# Patient Record
Sex: FEMALE | Race: WHITE | HISPANIC OR LATINO | Employment: UNEMPLOYED | URBAN - METROPOLITAN AREA
[De-identification: names, ages, dates, MRNs, and addresses within clinical notes are randomized per-mention and may not be internally consistent; named-entity substitution may affect disease eponyms.]

---

## 2017-05-16 ENCOUNTER — ALLSCRIPTS OFFICE VISIT (OUTPATIENT)
Dept: OTHER | Facility: OTHER | Age: 42
End: 2017-05-16

## 2017-11-08 ENCOUNTER — GENERIC CONVERSION - ENCOUNTER (OUTPATIENT)
Dept: OTHER | Facility: OTHER | Age: 42
End: 2017-11-08

## 2018-01-09 NOTE — RESULT NOTES
Verified Results  STRESS TEST ONLY, EXERCISE [id number] 12:21PM Osiel De Jesus Order Number: [id number]    - Patient Instructions: To schedule this appointment, please contact Central Scheduling at [phone]   Order Number: [id number]    - Patient Instructions: To schedule this appointment, please contact Central Scheduling at [phone]  Test Name Result Flag Reference   STRESS TEST ONLY, EXERCISE (Report)     [facility]  [place], [address]   [phone]     Stress Electrocardiography during Exercise     Name: Denis Villa   MR #: [id number]   Account #: [de-identified]   Study date: 2016   : 1975   Age: 46 years   Gender: Female   Height: 61 in   Weight: 149 lb   BSA: 1 67 m squared     Allergies: NO KNOWN ALLERGIES     Diagnosis: R07 9 - Chest pain, unspecified     Primary Physician: Mark Eckert MD   Referring Physician: Shilpa Leach PA-C   RN: Svitlana Rodriguez RN BSN   Technician: Catrachito Landers   GROUP: [facility]   Interpreting Physician: Irena Sanchez DO   Report Prepared By[de-identified] Svitlana Rodriguez RN BSN     CLINICAL QUESTION: Detection of coronary artery disease  HISTORY: The patient is a 46year old  female  Chest pain status: chest   pain  Coronary artery disease risk factors: hypertension  Cardiovascular   history: none significant  Medications: none  PHYSICAL EXAM: Baseline physical exam screening: normal lung exam      REST ECG: Normal sinus rhythm  Normal baseline ECG  PROCEDURE: Treadmill exercise testing was performed, using the Heriberto protocol  Stress electrocardiographic evaluation was performed       HERIBERTO PROTOCOL:   HR bpm SBP mmHg DBP mmHg Symptoms   Baseline 81 103 87 none   Stage 1 130 135 95 none   Stage 2 171 178 104 moderate dyspnea, fatigue   Immediate 173 155 80 same as above   Recovery 1 98 138 75 subsiding   Recovery 2 88 112 80 none   No medications or fluids given  STRESS SUMMARY: 02 sat at rest 98% and at peak stress 97%  Duration of exercise   was 6 min  The patient exercised to protocol stage 2  Maximal work rate was 7   METs  Maximal heart rate during stress was 173 bpm ( 96 % of maximal predicted   heart rate)  The heart rate response to stress was normal  There was normal   resting blood pressure with an appropriate response to stress  The   rate-pressure product for the peak heart rate and blood pressure was 91361  There was no chest pain during stress  The stress test was terminated due to   achievement of maximal (symptom limited) exercise and achievement of target   heart rate  The stress ECG was negative for ischemia  There were no stress   arrhythmias or conduction abnormalities  SUMMARY:   - Stress results: Duration of exercise was 6 min  Target heart rate was   achieved  There was no chest pain during stress  - ECG conclusions: The stress ECG was negative for ischemia  IMPRESSIONS: Normal study after maximal exercise without reproduction of   symptoms       Prepared and signed by     Ileana Velasco DO   Signed 08/23/2016 18:11:59

## 2018-01-12 VITALS
WEIGHT: 155.19 LBS | TEMPERATURE: 98.1 F | RESPIRATION RATE: 18 BRPM | OXYGEN SATURATION: 96 % | HEIGHT: 61 IN | BODY MASS INDEX: 29.3 KG/M2 | DIASTOLIC BLOOD PRESSURE: 80 MMHG | HEART RATE: 104 BPM | SYSTOLIC BLOOD PRESSURE: 120 MMHG

## 2018-01-14 NOTE — PROGRESS NOTES
Assessment    1  Overweight (278 02) (E66 3)   2  Migraine headache (346 90) (G43 909)   3  Encounter for preventive health examination (V70 0) (Z00 00)    Plan  Cervical cancer screening    · *1 - [facility] Physician Referral  Consult  Status: Voided  Care Summary provided  : Yes  Overweight    · (1) COMPREHENSIVE METABOLIC PANEL; Status:Active; Requested XFE:[id number];    · (1) LIPID PANEL, FASTING; Status:Active; Requested ZTI:[id number];   Screening for depression    · *VB-Depression Screening; Status:Complete;   Done: 73LCH2778 02:52PM  Vaginal discharge    · (1) CHLAMYDIA/GC AMPLIFIED DNA, PCR; Source:Vaginal; Status:Active; Requested  TGO:[id number];    · (1) GENITAL COMPREHENSIVE CULTURE; Source:Vaginal; Status:Active; Requested  VKM:[id number];     Discussion/Summary  health maintenance visit Currently, she eats a healthy diet  cervical cancer screening is current Testing was done today for chlamydia and and vaginal culture screen  Breast cancer screening: breast cancer screening is not indicated  Colorectal cancer screening: colorectal cancer screening is not indicated  Screening lab work includes thyroid function testing  The immunizations are up to date  Advice and education were given regarding nutrition and aerobic exercise  She will start exercising next month when cleared by vascular doctors  She is going to try to continue to eat a healthy diet  Continue on fioricet PRN  Possible side effects of new medications were reviewed with the patient/guardian today  The patient was counseled regarding instructions for management, impressions  Chief Complaint  Pt is here for a follow up to migraines  She states that she is feeling better with the medicine  History of Present Illness  , Adult Female: The patient is being seen for a health maintenance evaluation  The last health maintenance visit was 1 year(s) ago  General Health:  The patient's health since the last visit is described as good  She has regular dental visits  She denies vision problems  She denies hearing loss  Lifestyle:  She consumes a diverse and healthy diet  She has weight concerns  She does not exercise regularly  (cannot exercise for next 1 month due to spider vein treatment)   She does not use tobacco  She denies alcohol use  She denies drug use  Reproductive health:  she reports normal menses  Screening:       HPI: 47 y/o F here for PE  She has been doing better with migraines and fioricet does help  she rarely gets them  She has begun to have non-itchy clear/white vaginal discharge  SHe is sexually active with 1 partner  No UTI symptoms and no pelvic pain      Review of Systems    Constitutional: No fever, no chills, feels well, no tiredness, no recent weight gain or weight loss  Eyes: No complaints of eye pain, no red eyes, no eyesight problems, no discharge, no dry eyes, no itching of eyes  ENT: no complaints of earache, no loss of hearing, no nose bleeds, no nasal discharge, no sore throat, no hoarseness  Cardiovascular: No complaints of slow heart rate, no fast heart rate, no chest pain, no palpitations, no leg claudication, no lower extremity edema  Respiratory: No complaints of shortness of breath, no wheezing, no cough, no SOB on exertion, no orthopnea, no PND  Gastrointestinal: No complaints of abdominal pain, no constipation, no nausea or vomiting, no diarrhea, no bloody stools  Genitourinary: No complaints of dysuria, no incontinence, no pelvic pain, no dysmenorrhea, no vaginal discharge or bleeding  Musculoskeletal: No complaints of arthralgias, no myalgias, no joint swelling or stiffness, no limb pain or swelling  Integumentary: No complaints of skin rash or lesions, no itching, no skin wounds, no breast pain or lump     Neurological: No complaints of headache, no confusion, no convulsions, no numbness, no dizziness or fainting, no tingling, no limb weakness, no difficulty walking  Psychiatric: Not suicidal, no sleep disturbance, no anxiety or depression, no change in personality, no emotional problems  Endocrine: No complaints of proptosis, no hot flashes, no muscle weakness, no deepening of the voice, no feelings of weakness  Hematologic/Lymphatic: No complaints of swollen glands, no swollen glands in the neck, does not bleed easily, does not bruise easily  Active Problems    1  Acute respiratory disease (465 9) (J06 9)   2  Cervical cancer screening (V76 2) (Z12 4)   3  Dysuria (788 1) (R30 0)   4  Head contusion (920) (S00 93XA)   5  Low back pain (724 2) (M54 5)   6  Migraine headache (346 90) (G43 909)   7  Overweight (278 02) (E66 3)   8  Rosacea (695 3) (L71 9)   9  Sacroiliitis (720 2) (M46 1)   10  Scabies (133 0) (B86)   11  Screening for depression (V79 0) (Z13 89)   12  Sore throat (462) (J02 9)   13  Thoracic back pain (724 1) (M54 6)   14  Tinea pedis (110 4) (B35 3)   15  Urinary tract infection (599 0) (N39 0)   16  Urticaria (708 9) (L50 9)   17  Vaginal discharge (623 5) (N89 8)   18  Visit for routine gyn exam (V72 31) (Z01 419)    Past Medical History    · History of hypertension (V12 59) (Z86 79)    Surgical History    · History of Tubal Ligation    Family History  Father    · Family history of Type 2 Diabetes Mellitus  Family History    · Family history of Breast Cancer (V16 3)   · Family history of Cervical Cancer   · Family history of Gastric Cancer (V16 0)   · Family history of Hyperlipidemia   · Family history of Type 2 Diabetes Mellitus    Social History    · Denied: History of Alcohol Use (History)   · Denied: History of Drug Use   · Never A Smoker   · Denied: History of Smoking Cigarettes    Current Meds   1  Fioricet -40 MG Oral Capsule; Take 1 tablet every 6-8 hours as needed for   headache; Therapy: 47Ayk0122 to (Celeste Puls)  Requested for: 13QTT4096; Last   Rx:[id number] Ordered    Allergies    1   No Known Drug Allergies    Vitals   Recorded: 35JHV6635 02:42PM   Temperature 98 3 F, Tympanic   Heart Rate 91   Respiration 20   Systolic 970   Diastolic 80   Height 5 ft 1 in   Weight 148 lb 9 oz   BMI Calculated 28 07   BSA Calculated 1 66   O2 Saturation 99     Physical Exam    Constitutional   General appearance: No acute distress, well appearing and well nourished  Head and Face   Head and face: Normal     Palpation of the face and sinuses: No sinus tenderness  Eyes   Conjunctiva and lids: No swelling, erythema or discharge  Ears, Nose, Mouth, and Throat   External inspection of ears and nose: Normal     Otoscopic examination: Tympanic membranes translucent with normal light reflex  Canals patent without erythema  Oropharynx: Normal with no erythema, edema, exudate or lesions  Neck   Neck: Supple, symmetric, trachea midline, no masses  Thyroid: Normal, no thyromegaly  Pulmonary   Respiratory effort: No increased work of breathing or signs of respiratory distress  Auscultation of lungs: Clear to auscultation  Cardiovascular   Palpation of heart: Normal PMI, no thrills  Auscultation of heart: Normal rate and rhythm, normal S1 and S2, no murmurs  Abdomen   Abdomen: Non-tender, no masses  Liver and spleen: No hepatomegaly or splenomegaly  Genitourinary   External genitalia and vagina: Normal, no lesions appreciated  Lymphatic   Palpation of lymph nodes in neck: No lymphadenopathy  Musculoskeletal   Gait and station: Normal     Muscle strength/tone: Normal     Skin   Skin and subcutaneous tissue: Normal without rashes or lesions  Palpation of skin and subcutaneous tissue: Normal turgor      Psychiatric   Judgment and insight: Normal     Mood and affect: Normal        Results/Data  *VB-Depression Screening [id number] 02:52PM Terence Harris     Test Name Result Flag Reference   Depression Scale Result      Depression Screen - Negative For Symptoms     PHQ-2 Adult Depression Screening [id number] 02:50PM User, [facility]     Test Name Result Flag Reference   PHQ-2 Adult Depression Score 0     Over the last two weeks, how often have you been bothered by any of the following problems?   Little interest or pleasure in doing things: Not at all - 0  Feeling down, depressed, or hopeless: Not at all - 0   PHQ-2 Adult Depression Screening Negative         Signatures   Electronically signed by : UDAY Sanchez; Jun 17 2016 10:40AM EST                       (Author)    Electronically signed by : CHRISTI Barillas ; Jun 20 2016  3:09PM EST

## 2018-01-22 VITALS
TEMPERATURE: 98 F | OXYGEN SATURATION: 100 % | HEART RATE: 87 BPM | SYSTOLIC BLOOD PRESSURE: 122 MMHG | WEIGHT: 162 LBS | RESPIRATION RATE: 18 BRPM | BODY MASS INDEX: 30.58 KG/M2 | DIASTOLIC BLOOD PRESSURE: 84 MMHG | HEIGHT: 61 IN

## 2019-09-09 ENCOUNTER — OFFICE VISIT (OUTPATIENT)
Dept: FAMILY MEDICINE CLINIC | Facility: CLINIC | Age: 44
End: 2019-09-09

## 2019-09-09 VITALS
HEART RATE: 90 BPM | DIASTOLIC BLOOD PRESSURE: 80 MMHG | SYSTOLIC BLOOD PRESSURE: 126 MMHG | HEIGHT: 62 IN | RESPIRATION RATE: 18 BRPM | BODY MASS INDEX: 29.63 KG/M2 | WEIGHT: 161 LBS | TEMPERATURE: 97 F | OXYGEN SATURATION: 98 %

## 2019-09-09 DIAGNOSIS — Z12.39 BREAST CANCER SCREENING: ICD-10-CM

## 2019-09-09 DIAGNOSIS — J30.9 ALLERGIC RHINITIS, UNSPECIFIED SEASONALITY, UNSPECIFIED TRIGGER: ICD-10-CM

## 2019-09-09 DIAGNOSIS — M25.542 JOINT PAIN IN FINGERS OF BOTH HANDS: Primary | ICD-10-CM

## 2019-09-09 DIAGNOSIS — G43.709 CHRONIC MIGRAINE WITHOUT AURA WITHOUT STATUS MIGRAINOSUS, NOT INTRACTABLE: ICD-10-CM

## 2019-09-09 DIAGNOSIS — R60.1 GENERALIZED EDEMA: ICD-10-CM

## 2019-09-09 DIAGNOSIS — E66.3 OVERWEIGHT: ICD-10-CM

## 2019-09-09 DIAGNOSIS — Z80.3 FAMILY HISTORY OF BREAST CANCER: ICD-10-CM

## 2019-09-09 DIAGNOSIS — M25.541 JOINT PAIN IN FINGERS OF BOTH HANDS: Primary | ICD-10-CM

## 2019-09-09 PROBLEM — H69.83 DYSFUNCTION OF BOTH EUSTACHIAN TUBES: Status: ACTIVE | Noted: 2017-05-16

## 2019-09-09 PROBLEM — R42 VERTIGO: Status: ACTIVE | Noted: 2017-05-16

## 2019-09-09 PROCEDURE — 99214 OFFICE O/P EST MOD 30 MIN: CPT | Performed by: PHYSICIAN ASSISTANT

## 2019-09-09 RX ORDER — BUTALBITAL, ACETAMINOPHEN AND CAFFEINE 300; 40; 50 MG/1; MG/1; MG/1
CAPSULE ORAL
COMMUNITY
Start: 2014-04-14 | End: 2019-09-09 | Stop reason: SDUPTHER

## 2019-09-09 RX ORDER — FLUTICASONE PROPIONATE 50 MCG
2 SPRAY, SUSPENSION (ML) NASAL DAILY
Qty: 3 BOTTLE | Refills: 1 | Status: SHIPPED | OUTPATIENT
Start: 2019-09-09 | End: 2020-05-28 | Stop reason: SDUPTHER

## 2019-09-09 RX ORDER — FLUTICASONE PROPIONATE 50 MCG
2 SPRAY, SUSPENSION (ML) NASAL DAILY
COMMUNITY
Start: 2017-05-16 | End: 2019-09-09 | Stop reason: SDUPTHER

## 2019-09-09 RX ORDER — BUTALBITAL, ACETAMINOPHEN AND CAFFEINE 300; 40; 50 MG/1; MG/1; MG/1
1 CAPSULE ORAL EVERY 4 HOURS PRN
Qty: 30 CAPSULE | Refills: 2 | Status: SHIPPED | OUTPATIENT
Start: 2019-09-09 | End: 2019-10-23

## 2019-09-09 RX ORDER — MELOXICAM 15 MG/1
15 TABLET ORAL DAILY
Qty: 30 TABLET | Refills: 1 | Status: SHIPPED | OUTPATIENT
Start: 2019-09-09 | End: 2020-07-07 | Stop reason: SDUPTHER

## 2019-09-09 NOTE — PROGRESS NOTES
Assessment/Plan:    Allergic rhinitis  Continue flonase      Migraine headache  Continue PRN fioricet      Joint pain in fingers of both hands  Labs to be done-CBC, CMP, CRP, lyme, RF, CCP  Take meloxicam for pain      Overweight  BMI Counseling: Body mass index is 29 45 kg/m²  Discussed the patient's BMI with her  The BMI is above average  BMI counseling and education was provided to the patient  Exercise recommendations include moderate aerobic physical activity for 150 minutes/week  Problem List Items Addressed This Visit        Respiratory    Allergic rhinitis     Continue flonase           Relevant Medications    Butalbital-APAP-Caffeine -40 MG CAPS       Cardiovascular and Mediastinum    Migraine headache     Continue PRN fioricet           Relevant Medications    fluticasone (FLONASE) 50 mcg/act nasal spray    Butalbital-APAP-Caffeine -40 MG CAPS    meloxicam (MOBIC) 15 mg tablet       Other    Joint pain in fingers of both hands - Primary     Labs to be done-CBC, CMP, CRP, lyme, RF, CCP  Take meloxicam for pain           Relevant Medications    meloxicam (MOBIC) 15 mg tablet    Other Relevant Orders    CBC and differential    Comprehensive metabolic panel    TSH, 3rd generation    Lyme Antibody Profile with reflex to WB    RF Screen w/ Reflex to Titer    Cyclic citrul peptide antibody, IgG    C-reactive protein    Overweight     BMI Counseling: Body mass index is 29 45 kg/m²  Discussed the patient's BMI with her  The BMI is above average  BMI counseling and education was provided to the patient  Exercise recommendations include moderate aerobic physical activity for 150 minutes/week                 Other Visit Diagnoses     Breast cancer screening        Relevant Orders    Mammo screening bilateral w 3d & cad    Generalized edema        Relevant Orders    CBC and differential    Comprehensive metabolic panel    Family history of breast cancer                Subjective:      Patient ID: Valentin Sam is a 45 y o  female  HPI  63-year-old female with history of migraines here for a follow-up of migraines  When she has headaches is typically last 2-3 days  She a has been taking Fioricet in the past and does help  She is gettign sometimes  headaches 3 times a month  Sometimes she does feel a pressure in the back of the head        She is getting pain in her hands, feet, back  She gets no swelling in the joints but sometiems she gets it in the feet and hands diffusely  She gets stiffness in her back in the AM   She did PT in the past in for back pain and it did help  She has been using OTC aleeve to help  Back pain has been going on for years and joint pains has started to get worse in last 2 years  She also history of allergic rhinitis  In the past she was taking Flonase and did help  Symptoms for consisting of nasal congestion rhinorrhea  No ophthalmic symptoms  PHQ-9 Depression Screening    PHQ-9:    Frequency of the following problems over the past two weeks:       Little interest or pleasure in doing things:  0 - not at all  Feeling down, depressed, or hopeless:  0 - not at all  PHQ-2 Score:  0            The following portions of the patient's history were reviewed and updated as appropriate:   She  has a past medical history of Hypertension  She   Patient Active Problem List    Diagnosis Date Noted    Joint pain in fingers of both hands 09/09/2019    Overweight 09/09/2019    Allergic rhinitis 11/08/2017    Dysfunction of both eustachian tubes 05/16/2017    Vertigo 05/16/2017    Abnormal EKG 08/16/2016    Chest pain 08/16/2016    Low back pain 08/01/2014    Rosacea 05/19/2014    Difficult or painful urination 04/14/2014    Thoracic back pain 04/14/2014    Migraine headache 04/14/2014     She  has a past surgical history that includes Tubal ligation    Her family history includes Breast cancer in her family; Cervical cancer in her family; Diabetes type II in her father; Hyperlipidemia in her family; Stomach cancer in her family  She  reports that she has never smoked  She has never used smokeless tobacco  She reports that she does not drink alcohol or use drugs  Current Outpatient Medications   Medication Sig Dispense Refill    Butalbital-APAP-Caffeine -40 MG CAPS Take 1 capsule by mouth every 4 (four) hours as needed (headache) 30 capsule 2    fluticasone (FLONASE) 50 mcg/act nasal spray 2 sprays into each nostril daily 3 Bottle 1    meloxicam (MOBIC) 15 mg tablet Take 1 tablet (15 mg total) by mouth daily 30 tablet 1     No current facility-administered medications for this visit  Current Outpatient Medications on File Prior to Visit   Medication Sig    [DISCONTINUED] Butalbital-APAP-Caffeine -40 MG CAPS Take by mouth    [DISCONTINUED] fluticasone (FLONASE) 50 mcg/act nasal spray 2 sprays into each nostril daily     No current facility-administered medications on file prior to visit  She has No Known Allergies       Review of Systems   Constitutional: Positive for fatigue  Negative for activity change, appetite change, chills and unexpected weight change  HENT: Negative for dental problem, ear pain, hearing loss and sore throat  Eyes: Negative for visual disturbance  Respiratory: Negative for cough and wheezing  Cardiovascular: Negative for chest pain  Gastrointestinal: Negative for abdominal pain, constipation, diarrhea and vomiting  Genitourinary: Negative for difficulty urinating and dysuria  Musculoskeletal: Positive for arthralgias and back pain  Negative for myalgias  Skin: Negative for rash  Neurological: Positive for headaches  Negative for dizziness  Psychiatric/Behavioral: Negative for behavioral problems           Objective:      /80 (BP Location: Left arm, Patient Position: Sitting, Cuff Size: Standard)   Pulse 90   Temp (!) 97 °F (36 1 °C) (Temporal)   Resp 18   Ht 5' 2" (1 575 m)   Wt 73 kg (161 lb) LMP 09/08/2019 (Exact Date)   SpO2 98%   Breastfeeding? No   BMI 29 45 kg/m²          Physical Exam   Constitutional: She is oriented to person, place, and time  She appears well-developed and well-nourished  No distress  HENT:   Head: Normocephalic and atraumatic  Right Ear: External ear normal    Left Ear: External ear normal    Eyes: Conjunctivae are normal    Neck: Normal range of motion  Neck supple  No thyromegaly present  Cardiovascular: Normal rate, regular rhythm and normal heart sounds  No murmur heard  Pulmonary/Chest: Effort normal and breath sounds normal  No respiratory distress  She has no wheezes  Musculoskeletal: She exhibits edema (swelling of hands and feet  Increased at MCP and PIP joint and less at DIP joint) and tenderness (thoracic T6-8 and lumbar L3-5)  Lymphadenopathy:     She has no cervical adenopathy  Neurological: She is alert and oriented to person, place, and time  Psychiatric: She has a normal mood and affect  Her behavior is normal    Nursing note and vitals reviewed

## 2019-09-09 NOTE — ASSESSMENT & PLAN NOTE
BMI Counseling: Body mass index is 29 45 kg/m²  Discussed the patient's BMI with her  The BMI is above average  BMI counseling and education was provided to the patient  Exercise recommendations include moderate aerobic physical activity for 150 minutes/week

## 2019-09-09 NOTE — PROGRESS NOTES
She is gettign sometimes  Headaches 3 migraines  Sometimes she does feel a pressure in the back of the head  Sometimes the fioricet helps and sometimes it doesn't  She does get pain in the head    She is getting pain in her hands, feet, back  She gets no swelling but sometiems she gets it in the fet  She gets stiffness in her back in the AM   She did PT in the past in

## 2019-09-30 ENCOUNTER — LAB (OUTPATIENT)
Dept: LAB | Facility: CLINIC | Age: 44
End: 2019-09-30
Payer: COMMERCIAL

## 2019-09-30 DIAGNOSIS — M25.542 JOINT PAIN IN FINGERS OF BOTH HANDS: ICD-10-CM

## 2019-09-30 DIAGNOSIS — R60.1 GENERALIZED EDEMA: ICD-10-CM

## 2019-09-30 DIAGNOSIS — M25.541 JOINT PAIN IN FINGERS OF BOTH HANDS: ICD-10-CM

## 2019-09-30 LAB
ALBUMIN SERPL BCP-MCNC: 4.1 G/DL (ref 3.5–5)
ALP SERPL-CCNC: 84 U/L (ref 46–116)
ALT SERPL W P-5'-P-CCNC: 18 U/L (ref 12–78)
ANION GAP SERPL CALCULATED.3IONS-SCNC: 6 MMOL/L (ref 4–13)
AST SERPL W P-5'-P-CCNC: 9 U/L (ref 5–45)
BASOPHILS # BLD AUTO: 0.04 THOUSANDS/ΜL (ref 0–0.1)
BASOPHILS NFR BLD AUTO: 1 % (ref 0–1)
BILIRUB SERPL-MCNC: 0.73 MG/DL (ref 0.2–1)
BUN SERPL-MCNC: 13 MG/DL (ref 5–25)
CALCIUM SERPL-MCNC: 9.2 MG/DL (ref 8.3–10.1)
CHLORIDE SERPL-SCNC: 108 MMOL/L (ref 100–108)
CO2 SERPL-SCNC: 24 MMOL/L (ref 21–32)
CREAT SERPL-MCNC: 0.67 MG/DL (ref 0.6–1.3)
CRP SERPL QL: 4.8 MG/L
EOSINOPHIL # BLD AUTO: 0.18 THOUSAND/ΜL (ref 0–0.61)
EOSINOPHIL NFR BLD AUTO: 2 % (ref 0–6)
ERYTHROCYTE [DISTWIDTH] IN BLOOD BY AUTOMATED COUNT: 12.2 % (ref 11.6–15.1)
GFR SERPL CREATININE-BSD FRML MDRD: 109 ML/MIN/1.73SQ M
GLUCOSE P FAST SERPL-MCNC: 85 MG/DL (ref 65–99)
HCT VFR BLD AUTO: 44.6 % (ref 34.8–46.1)
HGB BLD-MCNC: 14.3 G/DL (ref 11.5–15.4)
IMM GRANULOCYTES # BLD AUTO: 0.03 THOUSAND/UL (ref 0–0.2)
IMM GRANULOCYTES NFR BLD AUTO: 0 % (ref 0–2)
LYMPHOCYTES # BLD AUTO: 2.75 THOUSANDS/ΜL (ref 0.6–4.47)
LYMPHOCYTES NFR BLD AUTO: 32 % (ref 14–44)
MCH RBC QN AUTO: 28.7 PG (ref 26.8–34.3)
MCHC RBC AUTO-ENTMCNC: 32.1 G/DL (ref 31.4–37.4)
MCV RBC AUTO: 90 FL (ref 82–98)
MONOCYTES # BLD AUTO: 0.65 THOUSAND/ΜL (ref 0.17–1.22)
MONOCYTES NFR BLD AUTO: 8 % (ref 4–12)
NEUTROPHILS # BLD AUTO: 5.07 THOUSANDS/ΜL (ref 1.85–7.62)
NEUTS SEG NFR BLD AUTO: 57 % (ref 43–75)
NRBC BLD AUTO-RTO: 0 /100 WBCS
PLATELET # BLD AUTO: 300 THOUSANDS/UL (ref 149–390)
PMV BLD AUTO: 10.1 FL (ref 8.9–12.7)
POTASSIUM SERPL-SCNC: 3.9 MMOL/L (ref 3.5–5.3)
PROT SERPL-MCNC: 8.3 G/DL (ref 6.4–8.2)
RBC # BLD AUTO: 4.98 MILLION/UL (ref 3.81–5.12)
SODIUM SERPL-SCNC: 138 MMOL/L (ref 136–145)
TSH SERPL DL<=0.05 MIU/L-ACNC: 1.31 UIU/ML (ref 0.36–3.74)
WBC # BLD AUTO: 8.72 THOUSAND/UL (ref 4.31–10.16)

## 2019-09-30 PROCEDURE — 86140 C-REACTIVE PROTEIN: CPT

## 2019-09-30 PROCEDURE — 36415 COLL VENOUS BLD VENIPUNCTURE: CPT

## 2019-09-30 PROCEDURE — 86200 CCP ANTIBODY: CPT

## 2019-09-30 PROCEDURE — 80053 COMPREHEN METABOLIC PANEL: CPT

## 2019-09-30 PROCEDURE — 86618 LYME DISEASE ANTIBODY: CPT

## 2019-09-30 PROCEDURE — 86430 RHEUMATOID FACTOR TEST QUAL: CPT

## 2019-09-30 PROCEDURE — 85025 COMPLETE CBC W/AUTO DIFF WBC: CPT

## 2019-09-30 PROCEDURE — 84443 ASSAY THYROID STIM HORMONE: CPT

## 2019-10-01 LAB
B BURGDOR IGG+IGM SER-ACNC: <0.91 ISR (ref 0–0.9)
RHEUMATOID FACT SER QL LA: NEGATIVE

## 2019-10-02 ENCOUNTER — TELEPHONE (OUTPATIENT)
Dept: FAMILY MEDICINE CLINIC | Facility: CLINIC | Age: 44
End: 2019-10-02

## 2019-10-02 LAB — CCP IGA+IGG SERPL IA-ACNC: 15 UNITS (ref 0–19)

## 2019-10-04 NOTE — RESULT ENCOUNTER NOTE
Her blood work looked good  She did have a little bit of inflammation however he was not significantly high  The rheumatoid arthritis and Lyme testing negative  It may be possible that she has something such as fibromyalgia which is when the nerves in the body start misfiring and  Causing pain  I recommend she continue with the meloxicam to see if that does help if there is no change we can consider different medication

## 2019-10-08 ENCOUNTER — OFFICE VISIT (OUTPATIENT)
Dept: FAMILY MEDICINE CLINIC | Facility: CLINIC | Age: 44
End: 2019-10-08

## 2019-10-08 VITALS
HEART RATE: 70 BPM | WEIGHT: 164 LBS | OXYGEN SATURATION: 95 % | BODY MASS INDEX: 30.18 KG/M2 | TEMPERATURE: 97.3 F | DIASTOLIC BLOOD PRESSURE: 80 MMHG | HEIGHT: 62 IN | SYSTOLIC BLOOD PRESSURE: 110 MMHG | RESPIRATION RATE: 18 BRPM

## 2019-10-08 DIAGNOSIS — M54.50 CHRONIC BILATERAL LOW BACK PAIN WITHOUT SCIATICA: Primary | ICD-10-CM

## 2019-10-08 DIAGNOSIS — M79.641 RIGHT HAND PAIN: ICD-10-CM

## 2019-10-08 DIAGNOSIS — M25.542 JOINT PAIN IN FINGERS OF BOTH HANDS: ICD-10-CM

## 2019-10-08 DIAGNOSIS — G89.29 CHRONIC BILATERAL LOW BACK PAIN WITHOUT SCIATICA: Primary | ICD-10-CM

## 2019-10-08 DIAGNOSIS — M25.541 JOINT PAIN IN FINGERS OF BOTH HANDS: ICD-10-CM

## 2019-10-08 DIAGNOSIS — E66.3 OVERWEIGHT: ICD-10-CM

## 2019-10-08 DIAGNOSIS — M79.601 RIGHT ARM PAIN: ICD-10-CM

## 2019-10-08 PROCEDURE — 3008F BODY MASS INDEX DOCD: CPT | Performed by: PHYSICIAN ASSISTANT

## 2019-10-08 PROCEDURE — 99214 OFFICE O/P EST MOD 30 MIN: CPT | Performed by: PHYSICIAN ASSISTANT

## 2019-10-08 RX ORDER — CYCLOBENZAPRINE HCL 5 MG
5 TABLET ORAL
Qty: 30 TABLET | Refills: 1 | Status: SHIPPED | OUTPATIENT
Start: 2019-10-08 | End: 2020-07-07

## 2019-10-08 NOTE — PROGRESS NOTES
Assessment/Plan:    Overweight  BMI Counseling: Body mass index is 30 kg/m²  The BMI is above normal  Patient referred to weight management due to patient being obese  She will set up appointment with Dr Dina Dubon      Low back pain  To start PT  Continue  meloxicam and start Flexeril 5 mg night    Joint pain in fingers of both hands   Rheumatologic workup was negative  Recommend she continue taking meloxicam   Will get x-ray of the right hand and wrist since this seems to be the worst of the pain other than  Back  Problem List Items Addressed This Visit        Other    Low back pain - Primary     To start PT  Continue  meloxicam and start Flexeril 5 mg night         Relevant Medications    cyclobenzaprine (FLEXERIL) 5 mg tablet    Other Relevant Orders    Ambulatory referral to Physical Therapy    Joint pain in fingers of both hands      Rheumatologic workup was negative  Recommend she continue taking meloxicam   Will get x-ray of the right hand and wrist since this seems to be the worst of the pain other than  Back  Overweight     BMI Counseling: Body mass index is 30 kg/m²  The BMI is above normal  Patient referred to weight management due to patient being obese  She will set up appointment with Dr Dina Dubon             Other Visit Diagnoses     Right arm pain        Relevant Orders    XR hand 3+ vw right    XR wrist 3+ vw right    Right hand pain        Relevant Orders    XR hand 3+ vw right    XR wrist 3+ vw right            Subjective:      Patient ID: Esther Gallegos is a 45 y o  female  HPI    46-year-old female here for a follow-up for joint pains  She has been getting pain  hands, feet, back  She was not getting swelling in the joints but she does get them in her hands and feet  She does have frequent stiffness the joints which is typically worse in the morning    She notes her lower back is often stiff especially in the morning and she often has problems opening closing her right hand and she gets swelling of the right hand  Sometimes the pain radiates from her right shoulder and to her right arm  She does sometimes get neck pain as well  She was started on meloxicam and it does help some but she is still getting stiffness in the morning  She had wanted to startphysical therapy for her back pain as it has helped before She also notes she has gained weight and she is going to be going to weight loss doctor to try to lose some  She states that she typically is eating small portions and   Not eating a  lot  She has not getting any structured exercise right now  The following portions of the patient's history were reviewed and updated as appropriate:   She  has a past medical history of Hypertension  She   Patient Active Problem List    Diagnosis Date Noted    Joint pain in fingers of both hands 09/09/2019    Overweight 09/09/2019    Allergic rhinitis 11/08/2017    Dysfunction of both eustachian tubes 05/16/2017    Vertigo 05/16/2017    Abnormal EKG 08/16/2016    Chest pain 08/16/2016    Low back pain 08/01/2014    Rosacea 05/19/2014    Difficult or painful urination 04/14/2014    Thoracic back pain 04/14/2014    Migraine headache 04/14/2014     She  has a past surgical history that includes Tubal ligation  Her family history includes Breast cancer in her family; Cervical cancer in her family; Diabetes type II in her father; Hyperlipidemia in her family; Stomach cancer in her family  She  reports that she has never smoked  She has never used smokeless tobacco  She reports that she does not drink alcohol or use drugs    Current Outpatient Medications   Medication Sig Dispense Refill    Butalbital-APAP-Caffeine -40 MG CAPS Take 1 capsule by mouth every 4 (four) hours as needed (headache) 30 capsule 2    fluticasone (FLONASE) 50 mcg/act nasal spray 2 sprays into each nostril daily 3 Bottle 1    meloxicam (MOBIC) 15 mg tablet Take 1 tablet (15 mg total) by mouth daily 30 tablet 1    cyclobenzaprine (FLEXERIL) 5 mg tablet Take 1 tablet (5 mg total) by mouth daily at bedtime 30 tablet 1     No current facility-administered medications for this visit  Current Outpatient Medications on File Prior to Visit   Medication Sig    Butalbital-APAP-Caffeine -40 MG CAPS Take 1 capsule by mouth every 4 (four) hours as needed (headache)    fluticasone (FLONASE) 50 mcg/act nasal spray 2 sprays into each nostril daily    meloxicam (MOBIC) 15 mg tablet Take 1 tablet (15 mg total) by mouth daily     No current facility-administered medications on file prior to visit  She has No Known Allergies       Review of Systems   Constitutional: Positive for fatigue  Negative for activity change, appetite change, chills and unexpected weight change  HENT: Negative for dental problem, ear pain, hearing loss and sore throat  Eyes: Negative for visual disturbance  Respiratory: Negative for cough and wheezing  Cardiovascular: Negative for chest pain and leg swelling  Gastrointestinal: Negative for abdominal pain, constipation, diarrhea and vomiting  Genitourinary: Negative for difficulty urinating and dysuria  Musculoskeletal: Positive for arthralgias and back pain  Negative for myalgias  Skin: Negative for rash  Neurological: Negative for dizziness and headaches  Psychiatric/Behavioral: Negative for behavioral problems  Objective:      /80 (BP Location: Left arm, Patient Position: Sitting, Cuff Size: Standard)   Pulse 70   Temp (!) 97 3 °F (36 3 °C) (Temporal)   Resp 18   Ht 5' 2" (1 575 m)   Wt 74 4 kg (164 lb)   LMP 10/02/2019   SpO2 95%   Breastfeeding? No   BMI 30 00 kg/m²          Physical Exam   Constitutional: She is oriented to person, place, and time  She appears well-developed and well-nourished  No distress  HENT:   Head: Normocephalic and atraumatic     Right Ear: External ear normal    Left Ear: External ear normal    Eyes: Conjunctivae are normal    Neck: Normal range of motion  Neck supple  No thyromegaly present  Cardiovascular: Normal rate, regular rhythm and normal heart sounds  No murmur heard  Pulmonary/Chest: Effort normal and breath sounds normal  No respiratory distress  She has no wheezes  Musculoskeletal: She exhibits tenderness  Lymphadenopathy:     She has no cervical adenopathy  Neurological: She is alert and oriented to person, place, and time  Psychiatric: She has a normal mood and affect  Her behavior is normal    Nursing note and vitals reviewed

## 2019-10-10 NOTE — ASSESSMENT & PLAN NOTE
Rheumatologic workup was negative  Recommend she continue taking meloxicam   Will get x-ray of the right hand and wrist since this seems to be the worst of the pain other than  Back

## 2019-10-10 NOTE — ASSESSMENT & PLAN NOTE
BMI Counseling: Body mass index is 30 kg/m²  The BMI is above normal  Patient referred to weight management due to patient being obese   She will set up appointment with Dr Jean Solo

## 2019-10-21 ENCOUNTER — TELEPHONE (OUTPATIENT)
Dept: FAMILY MEDICINE CLINIC | Facility: CLINIC | Age: 44
End: 2019-10-21

## 2019-10-23 DIAGNOSIS — G43.709 CHRONIC MIGRAINE WITHOUT AURA WITHOUT STATUS MIGRAINOSUS, NOT INTRACTABLE: Primary | ICD-10-CM

## 2019-10-23 RX ORDER — BUTALBITAL, ACETAMINOPHEN AND CAFFEINE 50; 325; 40 MG/1; MG/1; MG/1
1 TABLET ORAL EVERY 6 HOURS PRN
Qty: 30 TABLET | Refills: 1 | Status: SHIPPED | OUTPATIENT
Start: 2019-10-23 | End: 2020-05-28 | Stop reason: SDUPTHER

## 2019-11-04 ENCOUNTER — EVALUATION (OUTPATIENT)
Dept: PHYSICAL THERAPY | Facility: MEDICAL CENTER | Age: 44
End: 2019-11-04
Payer: COMMERCIAL

## 2019-11-04 DIAGNOSIS — M54.50 CHRONIC BILATERAL LOW BACK PAIN WITHOUT SCIATICA: Primary | ICD-10-CM

## 2019-11-04 DIAGNOSIS — G89.29 CHRONIC BILATERAL LOW BACK PAIN WITHOUT SCIATICA: Primary | ICD-10-CM

## 2019-11-04 PROCEDURE — 97112 NEUROMUSCULAR REEDUCATION: CPT | Performed by: PHYSICAL MEDICINE & REHABILITATION

## 2019-11-04 PROCEDURE — 97162 PT EVAL MOD COMPLEX 30 MIN: CPT | Performed by: PHYSICAL MEDICINE & REHABILITATION

## 2019-11-04 NOTE — PROGRESS NOTES
PT Evaluation     Today's date: 2019  Patient name: Abelino Khanna  : 1975  MRN: [id number]  Referring provider: Baldo Sanders PA-C  Dx:   Encounter Diagnosis     ICD-10-CM    1  Chronic bilateral low back pain without sciatica M54 5 Ambulatory referral to Physical Therapy    G89 29                   Assessment  Assessment details: Abelino Khanna is a pleasant 45 y o  female who presents with chronic lower back pain  The patient's greatest concerns are worry over not knowing what's wrong and fear of not being able to keep active  No further referral appears necessary at this time based upon examination results  Primary movement impairment diagnosis of poor lumbopelvic movement coordination resulting in pathoanatomical symptoms of Chronic bilateral low back pain without sciatica  (primary encounter diagnosis) and limiting her ability to carry, exercise or recreation, lift, perform household chores and reach overhead  Impairments include:  1) poor lumbopelvic movement coordination - addressing with neuromotor retraining   2) central sensitization - addressing with extensive counseling regarding pain neuroscience, diagnosis, prognosis, care options, and care planning  3) hip hypomobility - addressing with mobs and mobility exercises   4) LE neural tension - addressing with mobs and mobility exercises   5) poor lifting mechanics - addressing with functional retraining  6) transfer intolerance - addressing with functional retraining     Etiologic factors include fibromyalgia      Impairments: abnormal coordination, abnormal muscle firing, abnormal muscle tone, abnormal or restricted ROM, abnormal movement, activity intolerance, difficulty understanding, impaired physical strength, lacks appropriate home exercise program and pain with function    Symptom irritability: moderateUnderstanding of Dx/Px/POC: fair   Prognosis: good  Prognosis details: Positive prognostic indicators include positive attitude toward recovery  Negative prognostic indicators include chronicity of symptoms, high symptom irritability, fibromyalgia and obesity  Goals  Patient will be independent with home exercise program    Patient will be able to manage symptoms independently  Patient will be able to roll in bed without limitation due to pain  Patient will be able to get in/out of her car without limitation due to pain  Patient will be able to get in/out of bed without limitation due to pain  Patient will be able to squat to  objects from the floor without limitation due to pain  Patient will be able to lift without limitation due to pain  Plan  Plan details: Prognosis above is given PT services 2x/week tapering to 2x/month over the next 3 months and home program adherence  Patient would benefit from: skilled physical therapy  Planned modality interventions: thermotherapy: hydrocollator packs  Planned therapy interventions: abdominal trunk stabilization, activity modification, joint mobilization, manual therapy, motor coordination training, neuromuscular re-education, patient education, self care, therapeutic activities, therapeutic exercise, graded activity, home exercise program and behavior modification  Treatment plan discussed with: patient        Subjective Evaluation    History of Present Illness  Mechanism of injury: Work/School: does not work,   Home Life: getting off of the floor after painting her nails is very hard,   Hobbies/exercise: shopping, carrying the bags is more challenging,   Pain location: patient reports pain is all over,   HPI: Patient reports she felt bone pain for more than six months because of fibromyalgia  Patient reports she has to crawl out of bed due to her back pain and lifting her arms above her hurts     Aggravating factors: patient reports her pain is inconsistent based on how she gets up in the morning, she reports twisting back and forth is painful,   Relieving factors: patient denies that she has found anything that helps her pain  PMH: 4 years ago she had back pain, she had physical therapy at that point  Pain  Current pain ratin  At best pain rating: 3  At worst pain rating: 10    Patient Goals  Patient goal: be able to do her activites, and try to learn some exercises to help with her health        Objective     Static Posture     Comments  Lumbar ROM:  Flexion: 25% limited,  Extension: 50% limited, slight rotation to the right    Repeated motions:  Flexion: increase in pain  Extension: no change  Prone extension: slight centralization  Over pressure was not performed secondary to patients fibromyalgia    Transverse abdominis decreased feedforward noted during positional transfers  Patient reported TTP along all common fibromyalgia trigger points  Patient education was given on self myofascial release techniques  Exercise based HEP was given to strengthen abdominals and decrease fibromyalgia symptoms                Precautions: none      Manual                                                                                   Exercise Diary                                                                                                                                                                                                                                                                     HEP teaching and return demonstration 10"                Modalities

## 2019-11-08 ENCOUNTER — HOSPITAL ENCOUNTER (OUTPATIENT)
Dept: MAMMOGRAPHY | Facility: MEDICAL CENTER | Age: 44
Discharge: HOME | End: 2019-11-08
Payer: COMMERCIAL

## 2019-11-08 VITALS — HEIGHT: 62 IN | BODY MASS INDEX: 29.26 KG/M2 | WEIGHT: 159 LBS

## 2019-11-08 DIAGNOSIS — Z12.39 BREAST CANCER SCREENING: ICD-10-CM

## 2019-11-08 PROCEDURE — 77063 BREAST TOMOSYNTHESIS BI: CPT

## 2019-11-08 PROCEDURE — 77067 SCR MAMMO BI INCL CAD: CPT

## 2019-11-19 ENCOUNTER — OFFICE VISIT (OUTPATIENT)
Dept: PHYSICAL THERAPY | Facility: MEDICAL CENTER | Age: 44
End: 2019-11-19
Payer: COMMERCIAL

## 2019-11-19 DIAGNOSIS — G89.29 CHRONIC BILATERAL LOW BACK PAIN WITHOUT SCIATICA: Primary | ICD-10-CM

## 2019-11-19 DIAGNOSIS — M54.50 CHRONIC BILATERAL LOW BACK PAIN WITHOUT SCIATICA: Primary | ICD-10-CM

## 2019-11-19 PROCEDURE — 97110 THERAPEUTIC EXERCISES: CPT | Performed by: PHYSICAL MEDICINE & REHABILITATION

## 2019-11-19 PROCEDURE — 97112 NEUROMUSCULAR REEDUCATION: CPT | Performed by: PHYSICAL MEDICINE & REHABILITATION

## 2019-11-19 PROCEDURE — 97140 MANUAL THERAPY 1/> REGIONS: CPT | Performed by: PHYSICAL MEDICINE & REHABILITATION

## 2019-11-19 PROCEDURE — 97530 THERAPEUTIC ACTIVITIES: CPT | Performed by: PHYSICAL MEDICINE & REHABILITATION

## 2019-11-19 NOTE — PROGRESS NOTES
Daily Note     Today's date: 2019  Patient name: Alex Parisi  : 1975  MRN: [id number]  Referring provider: Karolyn Prieto PA-C  Dx:   Encounter Diagnosis     ICD-10-CM    1  Chronic bilateral low back pain without sciatica M54 5     G89 29                   Subjective: Ben Fragoso reported "I am having pain today "      Objective: See treatment diary below      Assessment: Tolerated treatment well  Patient would benefit from continued PT  Alex Parisi was able to initiate her therapy program which shows progress towards her goals  Skin integrity was checked pre and post modalities with no abnormal findings  She reported pain with all movements  Plan: Continue per plan of care        Precautions: none      Manual  2019            STM LB JR            STM Mike Shoulders JR                                                       Exercise Diary              TrA contractions 3x10 3"            TrA Leg Fallouts 3x10x2 3"            REIP 1x10                                                                Standing Rows Red 3x15            Standing Straight APD Red 3x15                                                                                                                                              HEP teaching and return demonstration                 Modalities              HP 10'

## 2019-11-27 ENCOUNTER — OFFICE VISIT (OUTPATIENT)
Dept: PHYSICAL THERAPY | Facility: MEDICAL CENTER | Age: 44
End: 2019-11-27
Payer: COMMERCIAL

## 2019-11-27 DIAGNOSIS — G89.29 CHRONIC BILATERAL LOW BACK PAIN WITHOUT SCIATICA: Primary | ICD-10-CM

## 2019-11-27 DIAGNOSIS — M54.50 CHRONIC BILATERAL LOW BACK PAIN WITHOUT SCIATICA: Primary | ICD-10-CM

## 2019-11-27 PROCEDURE — 97112 NEUROMUSCULAR REEDUCATION: CPT | Performed by: PHYSICAL MEDICINE & REHABILITATION

## 2019-11-27 PROCEDURE — 97140 MANUAL THERAPY 1/> REGIONS: CPT | Performed by: PHYSICAL MEDICINE & REHABILITATION

## 2019-11-27 PROCEDURE — 97110 THERAPEUTIC EXERCISES: CPT | Performed by: PHYSICAL MEDICINE & REHABILITATION

## 2019-11-27 NOTE — PROGRESS NOTES
Daily Note     Today's date: 2019  Patient name: Huy Larios  : 1975  MRN: [id number]  Referring provider: Sylvie Leong PA-C  Dx:   Encounter Diagnosis     ICD-10-CM    1  Chronic bilateral low back pain without sciatica M54 5     G89 29                   Subjective: Leann Mancera reported "I am feeling a little better today "      Objective: See treatment diary below      Assessment: Tolerated treatment well  Patient would benefit from continued PT  Leann Mancera reported relief following IASTM  She was able to progress her abdominal and scapular strengthening as seen below  At the end of the session she reported "I feel much better "      Plan: Continue per plan of care        Precautions: none      Manual  2019           STM LB JR JR           STM Mike Shoulders JR JR                                                      Exercise Diary             TrA contractions 3x10 3" 3x10 3"           TrA Leg Fallouts 3x10x2 3" 3x10x2 3"           REIP 1x10 1x10           Physioball press   OR 3x15                                                  Standing Rows Red 3x15 Grn 3x15           Standing Straight APD Red 3x15 Grn 3x15           Scapular retraction w/ ER  Red 3x12                                                                                                                                HEP teaching and return demonstration                 Modalities              HP 10'

## 2019-11-29 ENCOUNTER — OFFICE VISIT (OUTPATIENT)
Dept: PHYSICAL THERAPY | Facility: MEDICAL CENTER | Age: 44
End: 2019-11-29
Payer: COMMERCIAL

## 2019-11-29 DIAGNOSIS — M54.50 CHRONIC BILATERAL LOW BACK PAIN WITHOUT SCIATICA: Primary | ICD-10-CM

## 2019-11-29 DIAGNOSIS — G89.29 CHRONIC BILATERAL LOW BACK PAIN WITHOUT SCIATICA: Primary | ICD-10-CM

## 2019-11-29 PROCEDURE — 97530 THERAPEUTIC ACTIVITIES: CPT | Performed by: PHYSICAL MEDICINE & REHABILITATION

## 2019-11-29 PROCEDURE — 97110 THERAPEUTIC EXERCISES: CPT | Performed by: PHYSICAL MEDICINE & REHABILITATION

## 2019-11-29 PROCEDURE — 97140 MANUAL THERAPY 1/> REGIONS: CPT | Performed by: PHYSICAL MEDICINE & REHABILITATION

## 2019-11-29 PROCEDURE — 97112 NEUROMUSCULAR REEDUCATION: CPT | Performed by: PHYSICAL MEDICINE & REHABILITATION

## 2019-12-05 ENCOUNTER — OFFICE VISIT (OUTPATIENT)
Dept: PHYSICAL THERAPY | Facility: MEDICAL CENTER | Age: 44
End: 2019-12-05
Payer: COMMERCIAL

## 2019-12-05 DIAGNOSIS — G89.29 CHRONIC BILATERAL LOW BACK PAIN WITHOUT SCIATICA: Primary | ICD-10-CM

## 2019-12-05 DIAGNOSIS — M54.50 CHRONIC BILATERAL LOW BACK PAIN WITHOUT SCIATICA: Primary | ICD-10-CM

## 2019-12-05 PROCEDURE — 97140 MANUAL THERAPY 1/> REGIONS: CPT | Performed by: PHYSICAL MEDICINE & REHABILITATION

## 2019-12-05 PROCEDURE — 97530 THERAPEUTIC ACTIVITIES: CPT | Performed by: PHYSICAL MEDICINE & REHABILITATION

## 2019-12-05 PROCEDURE — 97112 NEUROMUSCULAR REEDUCATION: CPT | Performed by: PHYSICAL MEDICINE & REHABILITATION

## 2019-12-05 PROCEDURE — 97110 THERAPEUTIC EXERCISES: CPT | Performed by: PHYSICAL MEDICINE & REHABILITATION

## 2019-12-05 NOTE — PROGRESS NOTES
Daily Note     Today's date: 2019  Patient name: Ángel Hernandez  : 1975  MRN: [id number]  Referring provider: Indio Arora PA-C  Dx:   Encounter Diagnosis     ICD-10-CM    1  Chronic bilateral low back pain without sciatica M54 5     G89 29                   Subjective: Sanjuana Man reported "My shoulder is very painful today, I am not sure what happened but it hurts a whole lot "      Objective: See treatment diary below      Assessment: Tolerated treatment well  Patient would benefit from continued PT  Marylou's therapy program was not advanced during today's session and her resistance was decreased due to her subjective report of significant right shoulder pain  She reported relief following IASTM along her right shoulder  She demonstrated significant upper trap trigger points  Plan: Continue per plan of care        Precautions: none      Manual  2019         STM LB Candie Ramos JR         STM Mike Shoulders [address]                                                    Exercise Diary           TrA contractions 3x10 3" 3x10 3" 3x10 3" 3x10 3"         TrA Leg Fallouts 3x10x2 3" 3x10x2 3" 3x10x2 3" 3x10x2 3"         REIP 1x10 1x10           Physioball press   OR 3x15 OR 3x15 OR 3x15         LTR   2x10 3x10                                   Standing Rows Red 3x15 Grn 3x15 Blue 3x15 Red 3x15         Standing Straight APD Red 3x15 Grn 3x15 Blue 3x15 Red 3x15         Scapular retraction w/ ER  Red 3x12 Red 3x12 Red 3x12                                                                                                                              HEP teaching and return demonstration                 Modalities              HP 10'

## 2019-12-10 ENCOUNTER — OFFICE VISIT (OUTPATIENT)
Dept: PHYSICAL THERAPY | Facility: MEDICAL CENTER | Age: 44
End: 2019-12-10
Payer: COMMERCIAL

## 2019-12-10 DIAGNOSIS — G89.29 CHRONIC BILATERAL LOW BACK PAIN WITHOUT SCIATICA: Primary | ICD-10-CM

## 2019-12-10 DIAGNOSIS — M54.50 CHRONIC BILATERAL LOW BACK PAIN WITHOUT SCIATICA: Primary | ICD-10-CM

## 2019-12-10 PROCEDURE — 97110 THERAPEUTIC EXERCISES: CPT | Performed by: PHYSICAL MEDICINE & REHABILITATION

## 2019-12-10 PROCEDURE — 97530 THERAPEUTIC ACTIVITIES: CPT | Performed by: PHYSICAL MEDICINE & REHABILITATION

## 2019-12-10 PROCEDURE — 97140 MANUAL THERAPY 1/> REGIONS: CPT | Performed by: PHYSICAL MEDICINE & REHABILITATION

## 2019-12-10 PROCEDURE — 97112 NEUROMUSCULAR REEDUCATION: CPT | Performed by: PHYSICAL MEDICINE & REHABILITATION

## 2019-12-10 NOTE — PROGRESS NOTES
Daily Note     Today's date: 12/10/2019  Patient name: Federica Ortega  : 1975  MRN: [id number]  Referring provider: Rita Delong PA-C  Dx:   Encounter Diagnosis     ICD-10-CM    1  Chronic bilateral low back pain without sciatica M54 5     G89 29                 Subjective: Bryon Parisi reported "I am feeling a little better today "      Objective: See treatment diary below      Assessment: Tolerated treatment well  Patient would benefit from continued PT  Bryon Parisi was able to progress her therapy program as seen below which shows progress towards her goals  She continues to demonstrate notable soft tissue resistance and inflammation along her upper traps R>L  She denied pain with the increase in her therapy program  She did need to adjust her physioball presses to push with her elbows instead of her hands to decrease UE pain  Plan: Continue per plan of care        Precautions: none      Manual  2019 2019 2019 2019 12/10/2019        [facility] LB Beula Booze [facility] Mike Shoulders [address] [facility]                                                   Exercise Diary   12/10        TrA contractions 3x10 3" 3x10 3" 3x10 3" 3x10 3" 3x10 3"        TrA Leg Fallouts 3x10x2 3" 3x10x2 3" 3x10x2 3" 3x10x2 3" 3x10x2 3"        REIP 1x10 1x10           Physioball press   OR 3x15 OR 3x15 OR 3x15 OR 3x15        LTR   2x10 3x10 3x10        Palloff Presses     Bue 3x15x2                     Standing Rows Red 3x15 Grn 3x15 Blue 3x15 Red 3x15 Blue 3x15        Standing Straight APD Red 3x15 Grn 3x15 Blue 3x15 Red 3x15 Blue 3x15        Scapular retraction w/ ER  Red 3x12 Red 3x12 Red 3x12 Grn 3x12        Robbers     Red 3x10        Thoracic roll outs     4x30"                                                                                                   HEP teaching and return demonstration                 Modalities              HP 10'

## 2019-12-12 ENCOUNTER — OFFICE VISIT (OUTPATIENT)
Dept: PHYSICAL THERAPY | Facility: MEDICAL CENTER | Age: 44
End: 2019-12-12
Payer: COMMERCIAL

## 2019-12-12 DIAGNOSIS — M54.50 CHRONIC BILATERAL LOW BACK PAIN WITHOUT SCIATICA: Primary | ICD-10-CM

## 2019-12-12 DIAGNOSIS — G89.29 CHRONIC BILATERAL LOW BACK PAIN WITHOUT SCIATICA: Primary | ICD-10-CM

## 2019-12-12 PROCEDURE — 97530 THERAPEUTIC ACTIVITIES: CPT | Performed by: PHYSICAL MEDICINE & REHABILITATION

## 2019-12-12 PROCEDURE — 97112 NEUROMUSCULAR REEDUCATION: CPT | Performed by: PHYSICAL MEDICINE & REHABILITATION

## 2019-12-12 PROCEDURE — 97140 MANUAL THERAPY 1/> REGIONS: CPT | Performed by: PHYSICAL MEDICINE & REHABILITATION

## 2019-12-12 PROCEDURE — 97110 THERAPEUTIC EXERCISES: CPT | Performed by: PHYSICAL MEDICINE & REHABILITATION

## 2019-12-12 NOTE — PROGRESS NOTES
Daily Note     Today's date: 2019  Patient name: Florencio Doe  : 1975  MRN: [id number]  Referring provider: Baldo Parsons PA-C  Dx:   Encounter Diagnosis     ICD-10-CM    1  Chronic bilateral low back pain without sciatica M54 5     G89 29                   Subjective: Maine Plasencia reported "I am doing okay today, my lower back is hurting some but I am feeling a little better "      Objective: See treatment diary below      Assessment: Tolerated treatment well  Patient would benefit from continued PT  Maine Plasencia reported notable relief following seated CTJ  She also demonstrated an increase in cervical ROM following the technique  She was also able to increase her resistance for her therapy program as seen below  Plan: Continue per plan of care        Precautions: none      Manual  2019 2019 2019 2019 12/10/2019 2019       STM LB Ann-Marie Yara [facility] Mike Shoulders [facility]       Seated CTJ      [place]                                     Exercise Diary  11/19 11/27 11/29 12/5 12/10 12/12       TrA contractions 3x10 3" 3x10 3" 3x10 3" 3x10 3" 3x10 3" 3x10 3"       TrA Leg Fallouts 3x10x2 3" 3x10x2 3" 3x10x2 3" 3x10x2 3" 3x10x2 3" 3x10x2 3"       REIP 1x10 1x10           Physioball press   OR 3x15 OR 3x15 OR 3x15 OR 3x15 OR 3x15       LTR   2x10 3x10 3x10        Palloff Presses     Bue 3x15x2 Blue 3x15x2                    Standing Rows Red 3x15 Grn 3x15 Blue 3x15 Red 3x15 Blue 3x15 Blue 3x15       Standing Straight APD Red 3x15 Grn 3x15 Blue 3x15 Red 3x15 Blue 3x15 Blue 3x15       Scapular retraction w/ ER  Red 3x12 Red 3x12 Red 3x12 Grn 3x12 Grn 312       Robbers     Red 3x10 Grn 3x10       Thoracic roll outs     4x30" 4x30"                                                                                                  HEP teaching and return demonstration                 Modalities              HP 10'

## 2019-12-19 ENCOUNTER — OFFICE VISIT (OUTPATIENT)
Dept: PHYSICAL THERAPY | Facility: MEDICAL CENTER | Age: 44
End: 2019-12-19
Payer: COMMERCIAL

## 2019-12-19 DIAGNOSIS — G89.29 CHRONIC BILATERAL LOW BACK PAIN WITHOUT SCIATICA: Primary | ICD-10-CM

## 2019-12-19 DIAGNOSIS — M54.50 CHRONIC BILATERAL LOW BACK PAIN WITHOUT SCIATICA: Primary | ICD-10-CM

## 2019-12-19 PROCEDURE — 97112 NEUROMUSCULAR REEDUCATION: CPT | Performed by: PHYSICAL MEDICINE & REHABILITATION

## 2019-12-19 PROCEDURE — 97530 THERAPEUTIC ACTIVITIES: CPT | Performed by: PHYSICAL MEDICINE & REHABILITATION

## 2019-12-19 PROCEDURE — 97110 THERAPEUTIC EXERCISES: CPT | Performed by: PHYSICAL MEDICINE & REHABILITATION

## 2019-12-19 PROCEDURE — 97140 MANUAL THERAPY 1/> REGIONS: CPT | Performed by: PHYSICAL MEDICINE & REHABILITATION

## 2019-12-19 NOTE — PROGRESS NOTES
Daily Note     Today's date: 2019  Patient name: Devora Condon  : 1975  MRN: [id number]  Referring provider: Corwin Dumont PA-C  Dx:   Encounter Diagnosis     ICD-10-CM    1  Chronic bilateral low back pain without sciatica M54 5     G89 29                   Subjective: Paresh Brown reported "My shoulders are okay, but my back is bothering me a little bit "      Objective: See treatment diary below      Assessment: Tolerated treatment well  Patient would benefit from continued PT  Paresh Brown was able to progress her exercise resistance as seen below  She continues to be able to progress her therapy program  She continues to need some cueing for proper form  Plan: Continue per plan of care        Precautions: none      Manual  2019 2019 2019 2019 12/10/2019 2019 2019      STM LB [address]      STM Mike Shoulders [address]      Seated CTJ      Aury Sanchez                                    Exercise Diary  11/19 11/27 11/29 12/5 12/10 12/12 12/19      TrA contractions 3x10 3" 3x10 3" 3x10 3" 3x10 3" 3x10 3" 3x10 3" 3x10 3"      TrA Leg Fallouts 3x10x2 3" 3x10x2 3" 3x10x2 3" 3x10x2 3" 3x10x2 3" 3x10x2 3"       REIP 1x10 1x10           Physioball press   OR 3x15 OR 3x15 OR 3x15 OR 3x15 OR 3x15 OR 3x15      LTR   2x10 3x10 3x10        Palloff Presses     Bue 3x15x2 Blue 3x15x2 Black 3x15x2                   Standing Rows Red 3x15 Grn 3x15 Blue 3x15 Red 3x15 Blue 3x15 Blue 3x15 Black 3x15      Standing Straight APD Red 3x15 Grn 3x15 Blue 3x15 Red 3x15 Blue 3x15 Blue 3x15 Black 3x15      Scapular retraction w/ ER  Red 3x12 Red 3x12 Red 3x12 Grn 3x12 Grn 312 Blue      Robbers     Red 3x10 Grn 3x10 Grn 3x10      Thoracic roll outs     4x30" 4x30" 4x30"                                                                                                 HEP teaching and return demonstration                 Modalities              HP 10'

## 2019-12-26 ENCOUNTER — OFFICE VISIT (OUTPATIENT)
Dept: PHYSICAL THERAPY | Facility: MEDICAL CENTER | Age: 44
End: 2019-12-26
Payer: COMMERCIAL

## 2019-12-26 DIAGNOSIS — M54.50 CHRONIC BILATERAL LOW BACK PAIN WITHOUT SCIATICA: Primary | ICD-10-CM

## 2019-12-26 DIAGNOSIS — G89.29 CHRONIC BILATERAL LOW BACK PAIN WITHOUT SCIATICA: Primary | ICD-10-CM

## 2019-12-26 PROCEDURE — 97530 THERAPEUTIC ACTIVITIES: CPT | Performed by: PHYSICAL MEDICINE & REHABILITATION

## 2019-12-26 PROCEDURE — 97140 MANUAL THERAPY 1/> REGIONS: CPT | Performed by: PHYSICAL MEDICINE & REHABILITATION

## 2019-12-26 PROCEDURE — 97110 THERAPEUTIC EXERCISES: CPT | Performed by: PHYSICAL MEDICINE & REHABILITATION

## 2019-12-26 PROCEDURE — 97112 NEUROMUSCULAR REEDUCATION: CPT | Performed by: PHYSICAL MEDICINE & REHABILITATION

## 2019-12-26 NOTE — PROGRESS NOTES
Daily Note     Today's date: 2019  Patient name: Maye Styles  : 1975  MRN: [id number]  Referring provider: Rod Lima PA-C  Dx:   Encounter Diagnosis     ICD-10-CM    1  Chronic bilateral low back pain without sciatica M54 5     G89 29                 Subjective: Lelo Lindsey reported "I am having a little bit of pain today, overall I am getting better "      Objective: See treatment diary below      Assessment: Tolerated treatment well  Patient would benefit from continued PT  Lelo Lindsey demonstrated an increase in independence however, she continues to need verbal cueing for proper repetitions  Plan: Continue per plan of care        Precautions: none      Manual  2019 2019 2019 2019 12/10/2019 2019 2019 2019     [facility] [address]     [facility] [address]     Seated [facility]J      [place]                                   Exercise Diary  11/19 11/27 11/29 12/5 12/10 12/12 12/19 12/26     TrA contractions 3x10 3" 3x10 3" 3x10 3" 3x10 3" 3x10 3" 3x10 3" 3x10 3" 3x10 3"     TrA Leg Fallouts 3x10x2 3" 3x10x2 3" 3x10x2 3" 3x10x2 3" 3x10x2 3" 3x10x2 3"       REIP 1x10 1x10           Physioball press   OR 3x15 OR 3x15 OR 3x15 OR 3x15 OR 3x15 OR 3x15 OR 3x15     LTR   2x10 3x10 3x10        Palloff Presses     Bue 3x15x2 Blue 3x15x2 Black 3x15x2 Black 3x15x2                  Standing Rows Red 3x15 Grn 3x15 Blue 3x15 Red 3x15 Blue 3x15 Blue 3x15 Black 3x15 Black 3x15     Standing Straight APD Red 3x15 Grn 3x15 Blue 3x15 Red 3x15 Blue 3x15 Blue 3x15 Black 3x15 Black 3x15     Scapular retraction w/ ER  Red 3x12 Red 3x12 Red 3x12 Grn 3x12 Grn 312 Blue Blue 3x15     Robbers     Red 3x10 Grn 3x10 Grn 3x10 GRN 3x10     Thoracic roll outs     4x30" 4x30" 4x30" 4x30"                                                                                                HEP teaching and return demonstration                 Modalities              HP 10'

## 2020-01-03 ENCOUNTER — OFFICE VISIT (OUTPATIENT)
Dept: PHYSICAL THERAPY | Facility: MEDICAL CENTER | Age: 45
End: 2020-01-03
Payer: COMMERCIAL

## 2020-01-03 DIAGNOSIS — G89.29 CHRONIC BILATERAL LOW BACK PAIN WITHOUT SCIATICA: Primary | ICD-10-CM

## 2020-01-03 DIAGNOSIS — M54.50 CHRONIC BILATERAL LOW BACK PAIN WITHOUT SCIATICA: Primary | ICD-10-CM

## 2020-01-03 PROCEDURE — 97140 MANUAL THERAPY 1/> REGIONS: CPT

## 2020-01-03 PROCEDURE — 97112 NEUROMUSCULAR REEDUCATION: CPT

## 2020-01-03 PROCEDURE — 97110 THERAPEUTIC EXERCISES: CPT

## 2020-01-03 NOTE — PROGRESS NOTES
Daily Note     Today's date: 1/3/2020  Patient name: Altaf Girard  : 1975  MRN: [id number]  Referring provider: Malik Small PA-C  Dx:   Encounter Diagnosis     ICD-10-CM    1  Chronic bilateral low back pain without sciatica M54 5     G89 29                 Subjective: Isaac Woodruff reports lumbar pain present upon arrival  She states today her low back sx's are aggravated but continue to improve with PT        Objective: See treatment diary below      Assessment: Pt continues to tolerate current POC well  Pt presents with restriction in L UT and along lumbar paraspinals with STM  Good tolerance to all TE, verbal cueing required to main TA engagement and proper posture throughout exercise  Pt noted improved lumbar sx's post session  Pt would benefit from continued PT, progress as able  Plan: Continue per plan of care      Pt 1:1 with PTA HANNAH 9:30-10:15     Precautions: none      Manual  2019 2019 2019 2019 12/10/2019 2019 2019 1/3/2020     [facility] [address] [facility]     STM Mike Shoulders LJ CG VI CY VY MV MB XW     Se[place]                                   Exercise Diary  11/19 11/27 11/29 12/5 12/10 12/12 12/19 12/26 1/3/2020    TrA contractions 3x10 3" 3x10 3" 3x10 3" 3x10 3" 3x10 3" 3x10 3" 3x10 3" 3x10 3" 3x10 3"    TrA Leg Fallouts 3x10x2 3" 3x10x2 3" 3x10x2 3" 3x10x2 3" 3x10x2 3" 3x10x2 3"       REIP 1x10 1x10           Physioball press   OR 3x15 OR 3x15 OR 3x15 OR 3x15 OR 3x15 OR 3x15 OR 3x15 OR  3x15    LTR   2x10 3x10 3x10        Palloff Presses     Bue 3x15x2 Blue 3x15x2 Black 3x15x2 Black 3x15x2 Black 3x15x2                 Standing Rows Red 3x15 Grn 3x15 Blue 3x15 Red 3x15 Blue 3x15 Blue 3x15 Black 3x15 Black 3x15 Black  3x15    Standing Straight APD Red 3x15 Grn 3x15 Blue 3x15 Red 3x15 Blue 3x15 Blue 3x15 Black 3x15 Black 3x15 Black   3x15    Scapular retraction w/ ER  Red 3x12 Red 3x12 Red 3x12 Grn 3x12 Grn 312 Blue Blue 3x15 Blue   3x15 Juan     Red 3x10 Grn 3x10 Grn 3x10 GRN 3x10 GRN  3x10    Thoracic roll outs     4x30" 4x30" 4x30" 4x30" 4x30"                                                                                               HEP teaching and return demonstration                 Modalities              HP 10'

## 2020-01-09 ENCOUNTER — OFFICE VISIT (OUTPATIENT)
Dept: PHYSICAL THERAPY | Facility: MEDICAL CENTER | Age: 45
End: 2020-01-09
Payer: COMMERCIAL

## 2020-01-09 DIAGNOSIS — M54.50 CHRONIC BILATERAL LOW BACK PAIN WITHOUT SCIATICA: Primary | ICD-10-CM

## 2020-01-09 DIAGNOSIS — G89.29 CHRONIC BILATERAL LOW BACK PAIN WITHOUT SCIATICA: Primary | ICD-10-CM

## 2020-01-09 PROCEDURE — 97110 THERAPEUTIC EXERCISES: CPT | Performed by: PHYSICAL MEDICINE & REHABILITATION

## 2020-01-09 PROCEDURE — 97140 MANUAL THERAPY 1/> REGIONS: CPT | Performed by: PHYSICAL MEDICINE & REHABILITATION

## 2020-01-09 PROCEDURE — 97530 THERAPEUTIC ACTIVITIES: CPT | Performed by: PHYSICAL MEDICINE & REHABILITATION

## 2020-01-09 PROCEDURE — 97112 NEUROMUSCULAR REEDUCATION: CPT | Performed by: PHYSICAL MEDICINE & REHABILITATION

## 2020-01-09 NOTE — PROGRESS NOTES
PT Re-Evaluation     Today's date: 2020  Patient name: Radha Diaz  : 1975  MRN: [id number]  Referring provider: Cee Giraldo PA-C  Dx:   Encounter Diagnosis     ICD-10-CM    1  Chronic bilateral low back pain without sciatica M54 5     G89 29                   Assessment  Assessment details: Radha Diaz is a pleasant 45 y o  female who presents with chronic lower back pain  The patient's greatest concerns are worry over not knowing what's wrong and fear of not being able to keep active  No further referral appears necessary at this time based upon examination results  Primary movement impairment diagnosis of poor lumbopelvic movement coordination resulting in pathoanatomical symptoms of Chronic bilateral low back pain without sciatica  (primary encounter diagnosis) and limiting her ability to carry, exercise or recreation, lift, perform household chores and reach overhead  Impairments include:  1) poor lumbopelvic movement coordination - addressing with neuromotor retraining   2) central sensitization - addressing with extensive counseling regarding pain neuroscience, diagnosis, prognosis, care options, and care planning  3) hip hypomobility - addressing with mobs and mobility exercises   4) LE neural tension - addressing with mobs and mobility exercises   5) poor lifting mechanics - addressing with functional retraining  6) transfer intolerance - addressing with functional retraining     Etiologic factors include fibromyalgia  Impairments: abnormal coordination, abnormal muscle firing, abnormal muscle tone, abnormal or restricted ROM, abnormal movement, activity intolerance, difficulty understanding, impaired physical strength, lacks appropriate home exercise program and pain with function    Symptom irritability: moderateUnderstanding of Dx/Px/POC: fair   Prognosis: good  Prognosis details: Positive prognostic indicators include positive attitude toward recovery    Negative prognostic indicators include chronicity of symptoms, high symptom irritability, fibromyalgia and obesity  Goals  Patient will be independent with home exercise program  - partially met  Patient will be able to manage symptoms independently  - partially met  Patient will be able to roll in bed without limitation due to pain  - partially met  Patient will be able to get in/out of her car without limitation due to pain  - partially met  Patient will be able to get in/out of bed without limitation due to pain  - partially met  Patient will be able to squat to  objects from the floor without limitation due to pain  - partially met  Patient will be able to lift without limitation due to pain  - partially met    Plan  Plan details: Prognosis above is given PT services 2x/week tapering to 2x/month over the next 1 month and home program adherence    Patient would benefit from: skilled physical therapy  Planned modality interventions: thermotherapy: hydrocollator packs  Planned therapy interventions: abdominal trunk stabilization, activity modification, joint mobilization, manual therapy, motor coordination training, neuromuscular re-education, patient education, self care, therapeutic activities, therapeutic exercise, graded activity, home exercise program and behavior modification  Treatment plan discussed with: patient        Subjective Evaluation    History of Present Illness  Mechanism of injury: Bryon Ailin reported "My back and shoulders have been feeling much better, they are a little more painful but I think it is stress because of what is happening back home in [facility] "  Pain  Current pain rating: 3  At best pain ratin  At worst pain ratin    Patient Goals  Patient goal: be able to do her activites, and try to learn some exercises to help with her health        Objective     Static Posture     Comments  Lumbar ROM:  Flexion: 10% limited,  Extension: 25% limited, slight rotation to the right    Repeated motions:  Flexion: increase in pain  Extension: no change  Prone extension: slight centralization  Over pressure was not performed secondary to patients fibromyalgia    Transverse abdominis decreased feedforward noted during positional transfers  Patient reported TTP along all common fibromyalgia trigger points  Patient education was given on self myofascial release techniques  - As of 1/9 her trigger points tenderness have decreased as per patient report  Exercise based HEP was given to strengthen abdominals and decrease fibromyalgia symptoms                Precautions: none      Manual  11/19/2019 11/27/2019 11/29/2019 12/5/2019 12/10/2019 12/12/2019 12/19/2019 1/3/2020  1/9/2020   [facility] [address]   [facility]  QT RW FH SH VX QK BRIZUELA  BL   Seated [facility]                                 Exercise Diary  11/19 11/27 11/29 12/5 12/10 12/12 12/19 12/26 1/3/2020 1/9   TrA contractions 3x10 3" 3x10 3" 3x10 3" 3x10 3" 3x10 3" 3x10 3" 3x10 3" 3x10 3" 3x10 3"    TrA Leg Fallouts 3x10x2 3" 3x10x2 3" 3x10x2 3" 3x10x2 3" 3x10x2 3" 3x10x2 3"       REIP 1x10 1x10           Physioball press   OR 3x15 OR 3x15 OR 3x15 OR 3x15 OR 3x15 OR 3x15 OR 3x15 OR  3x15 OR 3x15   LTR   2x10 3x10 3x10        Palloff Presses     Bue 3x15x2 Blue 3x15x2 Black 3x15x2 Black 3x15x2 Black 3x15x2 Black 3x15x2                Standing Rows Red 3x15 Grn 3x15 Blue 3x15 Red 3x15 Blue 3x15 Blue 3x15 Black 3x15 Black 3x15 Black  3x15 Black 3x15   Standing Straight APD Red 3x15 Grn 3x15 Blue 3x15 Red 3x15 Blue 3x15 Blue 3x15 Black 3x15 Black 3x15 Black   3x15 Black 3x15   Scapular retraction w/ ER  Red 3x12 Red 3x12 Red 3x12 Grn 3x12 Grn 312 Blue Blue 3x15 Blue   3x15 Blue 3x15   Robbers     Red 3x10 Grn 3x10 Grn 3x10 GRN 3x10 GRN  3x10 Grn 3x10   Thoracic roll outs     4x30" 4x30" 4x30" 4x30" 4x30" 4x30" HEP teaching and return demonstration                 Modalities               10'

## 2020-01-14 ENCOUNTER — OFFICE VISIT (OUTPATIENT)
Dept: PHYSICAL THERAPY | Facility: MEDICAL CENTER | Age: 45
End: 2020-01-14
Payer: COMMERCIAL

## 2020-01-14 DIAGNOSIS — M54.50 CHRONIC BILATERAL LOW BACK PAIN WITHOUT SCIATICA: Primary | ICD-10-CM

## 2020-01-14 DIAGNOSIS — G89.29 CHRONIC BILATERAL LOW BACK PAIN WITHOUT SCIATICA: Primary | ICD-10-CM

## 2020-01-14 PROCEDURE — 97110 THERAPEUTIC EXERCISES: CPT | Performed by: PHYSICAL MEDICINE & REHABILITATION

## 2020-01-14 PROCEDURE — 97530 THERAPEUTIC ACTIVITIES: CPT | Performed by: PHYSICAL MEDICINE & REHABILITATION

## 2020-01-14 PROCEDURE — 97140 MANUAL THERAPY 1/> REGIONS: CPT | Performed by: PHYSICAL MEDICINE & REHABILITATION

## 2020-01-14 PROCEDURE — 97112 NEUROMUSCULAR REEDUCATION: CPT | Performed by: PHYSICAL MEDICINE & REHABILITATION

## 2020-01-14 NOTE — PROGRESS NOTES
Daily Note     Today's date: 2020  Patient name: Devora Condon  : 1975  MRN: [id number]  Referring provider: Corwin Dumont PA-C  Dx:   Encounter Diagnosis     ICD-10-CM    1  Chronic bilateral low back pain without sciatica M54 5     G89 29                   Subjective: Paresh Bi reported "I am doing better than last time, but I am still having pain my right shoulder is worse than the left  My back is feeling pretty good "      Objective: See treatment diary below      Assessment: Tolerated treatment well  Patient would benefit from continued PT  Patient demonstrated good control during her scapular exercises  She continues to report pain on the right shoulder greater than the left  She demonstrated notable tissue resistance along her levator scapulae  She reported radicular symptoms consistent with trigger points along her LS and infraspinatus on the right side  Skin integrity was checked pre and post modalities with no abnormal findings  Hot packs were applied bilaterally to her shoulders  Plan: Continue per plan of care        Precautions: none      Manual  2020  2019 2019 12/10/2019 2019 2019 1/3/2020  2020   STM [facility]   [address]   STM [facility] [address] [address]  QH VS DN AL RL RX  TE   Seated [facility]                                 Exercise Diary  1/14  11/29 12/5 12/10 12/12 12/19 12/26 1/3/2020 1/9   TrA contractions   3x10 3" 3x10 3" 3x10 3" 3x10 3" 3x10 3" 3x10 3" 3x10 3"    TrA Leg Fallouts   3x10x2 3" 3x10x2 3" 3x10x2 3" 3x10x2 3"       REIP             Physioball press    OR 3x15 OR 3x15 OR 3x15 OR 3x15 OR 3x15 OR 3x15 OR  3x15 OR 3x15   LTR   2x10 3x10 3x10        Palloff Presses     Bue 3x15x2 Blue 3x15x2 Black 3x15x2 Black 3x15x2 Black 3x15x2 Black 3x15x2                Standing Rows Black 3x15  Blue 3x15 Red 3x15 Blue 3x15 Blue 3x15 Black 3x15 Black 3x15 Black  3x15 Black 3x15   Standing Straight APD Black 3x15  Blue 3x15 Red 3x15 Jenn [place]kert 3x15 Blue 3x15 Black 3x15 Black 3x15 Black   3x15 Black 3x15   Scapular retraction w/ ER   Red 3x12 Red 3x12 Grn 3x12 Grn 312 Blue Blue 3x15 Blue   3x15 Blue 3x15   Robbers Grn 3x15    Red 3x10 Grn 3x10 Grn 3x10 GRN 3x10 GRN  3x10 Grn 3x10   Thoracic roll outs 4x30"    4x30" 4x30" 4x30" 4x30" 4x30" 4x30"                                                                                              HEP teaching and return demonstration                 Modalities               10' 10'

## 2020-01-23 ENCOUNTER — OFFICE VISIT (OUTPATIENT)
Dept: PHYSICAL THERAPY | Facility: MEDICAL CENTER | Age: 45
End: 2020-01-23
Payer: COMMERCIAL

## 2020-01-23 DIAGNOSIS — M54.50 CHRONIC BILATERAL LOW BACK PAIN WITHOUT SCIATICA: Primary | ICD-10-CM

## 2020-01-23 DIAGNOSIS — G89.29 CHRONIC BILATERAL LOW BACK PAIN WITHOUT SCIATICA: Primary | ICD-10-CM

## 2020-01-23 PROCEDURE — 97110 THERAPEUTIC EXERCISES: CPT | Performed by: PHYSICAL THERAPIST

## 2020-01-23 PROCEDURE — 97112 NEUROMUSCULAR REEDUCATION: CPT | Performed by: PHYSICAL THERAPIST

## 2020-01-23 PROCEDURE — 97140 MANUAL THERAPY 1/> REGIONS: CPT | Performed by: PHYSICAL THERAPIST

## 2020-01-23 NOTE — PROGRESS NOTES
Daily Note     Today's date: 2020  Patient name: Cathryn Noble  : 1975  MRN: [id number]  Referring provider: Kash Hamlin PA-C  Dx:   Encounter Diagnosis     ICD-10-CM    1  Chronic bilateral low back pain without sciatica M54 5     G89 29                   Subjective: Markus Ji reports, "I have pain every morning when I wake up  My whole body hurts every day "    Objective: See treatment diary below    Assessment: Tolerated treatment well  Patient would benefit from continued PT  Patient presents c/ severe myofascial restrictions and palpable knots t/o R UT/LS mm  She also presents c/ R lateral elbow pain and low back pain  Patient responded well to STM/IASTM  Hot packs applied at the end of tx  Plan: Continue per plan of care        Precautions: none      Manual  2020  2019 2019 12/10/2019 2019 2019 1/3/2020  2020   [facility] [address]   STM [facility] Shoulders ZC  FX SP RU AJ AL EK  MT   Seated [facility]                                 Exercise Diary  1/23  11/29 12/5 12/10 12/12 12/19 12/26 1/3/2020 1/9   TrA contractions   3x10 3" 3x10 3" 3x10 3" 3x10 3" 3x10 3" 3x10 3" 3x10 3"    TrA Leg Fallouts   3x10x2 3" 3x10x2 3" 3x10x2 3" 3x10x2 3"       REIP             Physioball press    OR 3x15 OR 3x15 OR 3x15 OR 3x15 OR 3x15 OR 3x15 OR  3x15 OR 3x15   LTR   2x10 3x10 3x10        Palloff Presses Blue 3x10     Bue 3x15x2 Blue 3x15x2 Black 3x15x2 Black 3x15x2 Black 3x15x2 Black 3x15x2                Standing Rows Blue 3x15  Blue 3x15 Red 3x15 Blue 3x15 Blue 3x15 Black 3x15 Black 3x15 Black  3x15 Black 3x15   Standing Straight APD Blue 3x10  Blue 3x15 Red 3x15 Blue 3x15 Blue 3x15 Black 3x15 Black 3x15 Black   3x15 Black 3x15   Scapular retraction w/ ER   Red 3x12 Red 3x12 Grn 3x12 Grn 312 Blue Blue 3x15 Blue   3x15 Blue 3x15   Robbers Grn 3x15    Red 3x10 Grn 3x10 Grn 3x10 GRN 3x10 GRN  3x10 Grn 3x10   Thoracic roll outs 4x30"    4x30" 4x30" 4x30" 4x30" 4x30" 4x30"                                                                                              HEP teaching and return demonstration                 Modalities              HP 10' 10'

## 2020-01-28 ENCOUNTER — OFFICE VISIT (OUTPATIENT)
Dept: PHYSICAL THERAPY | Facility: MEDICAL CENTER | Age: 45
End: 2020-01-28
Payer: COMMERCIAL

## 2020-01-28 DIAGNOSIS — M54.50 CHRONIC BILATERAL LOW BACK PAIN WITHOUT SCIATICA: Primary | ICD-10-CM

## 2020-01-28 DIAGNOSIS — G89.29 CHRONIC BILATERAL LOW BACK PAIN WITHOUT SCIATICA: Primary | ICD-10-CM

## 2020-01-28 PROCEDURE — 97530 THERAPEUTIC ACTIVITIES: CPT | Performed by: PHYSICAL MEDICINE & REHABILITATION

## 2020-01-28 PROCEDURE — 97140 MANUAL THERAPY 1/> REGIONS: CPT | Performed by: PHYSICAL MEDICINE & REHABILITATION

## 2020-01-28 PROCEDURE — 97112 NEUROMUSCULAR REEDUCATION: CPT | Performed by: PHYSICAL MEDICINE & REHABILITATION

## 2020-01-28 PROCEDURE — 97110 THERAPEUTIC EXERCISES: CPT | Performed by: PHYSICAL MEDICINE & REHABILITATION

## 2020-01-28 NOTE — PROGRESS NOTES
PT Discharge    Today's date: 2020  Patient name: Annalee Nelson  : 1975  MRN: [id number]  Referring provider: Amira Barreto PA-C  Dx:   Encounter Diagnosis     ICD-10-CM    1  Chronic bilateral low back pain without sciatica M54 5     G89 29                   Assessment  Assessment details: Annalee Nelson is a pleasant 45 y o  female who presents with chronic lower back pain  Annalee Nelson has made excellent progress towards her goals and has demonstrated independence with her HEP  Due to her progress towards her goals, her independence with her HEP and her reported self-efficacy with her HEP she is being D/Bashir to her HEP at this time  No further referral appears necessary at this time based upon examination results  Primary movement impairment diagnosis of poor lumbopelvic movement coordination resulting in pathoanatomical symptoms of Chronic bilateral low back pain without sciatica  (primary encounter diagnosis) and limiting her ability to carry, exercise or recreation, lift, perform household chores and reach overhead  Impairments include:  1) poor lumbopelvic movement coordination - addressing with neuromotor retraining   2) central sensitization - addressing with extensive counseling regarding pain neuroscience, diagnosis, prognosis, care options, and care planning  3) hip hypomobility - addressing with mobs and mobility exercises   4) LE neural tension - addressing with mobs and mobility exercises   5) poor lifting mechanics - addressing with functional retraining  6) transfer intolerance - addressing with functional retraining     Etiologic factors include fibromyalgia      Impairments: abnormal coordination, abnormal muscle firing, abnormal muscle tone, abnormal or restricted ROM, abnormal movement, activity intolerance, difficulty understanding, impaired physical strength, lacks appropriate home exercise program and pain with function    Symptom irritability: moderateUnderstanding of Dx/Px/POC: fair   Prognosis: good  Prognosis details: Positive prognostic indicators include positive attitude toward recovery  Negative prognostic indicators include chronicity of symptoms, high symptom irritability, fibromyalgia and obesity  Goals  Patient will be independent with home exercise program  - met  Patient will be able to manage symptoms independently  -  met  Patient will be able to roll in bed without limitation due to pain  -  met  Patient will be able to get in/out of her car without limitation due to pain  -  met  Patient will be able to get in/out of bed without limitation due to pain  -  met  Patient will be able to squat to  objects from the floor without limitation due to pain  - met  Patient will be able to lift without limitation due to pain   -  met    Plan  Patient would benefit from: skilled physical therapy  Planned modality interventions: thermotherapy: hydrocollator packs  Planned therapy interventions: abdominal trunk stabilization, activity modification, joint mobilization, manual therapy, motor coordination training, neuromuscular re-education, patient education, self care, therapeutic activities, therapeutic exercise, graded activity, home exercise program and behavior modification  Treatment plan discussed with: patient        Subjective Evaluation    History of Present Illness  Mechanism of injury: Laurium Burt reported "I am doing much better, I think I can be done now "  Pain  Current pain ratin  At best pain ratin  At worst pain rating: 3    Patient Goals  Patient goal: be able to do her activites, and try to learn some exercises to help with her health        Objective     Static Posture     Comments  Lumbar ROM:  Flexion: 10% limited,  Extension: 10% limited, slight rotation to the right    Repeated motions:  Flexion: increase in pain  Extension: no change  Prone extension: slight centralization  Over pressure was not performed secondary to patients fibromyalgia    Transverse abdominis decreased feedforward noted during positional transfers  Patient reported TTP along all common fibromyalgia trigger points  Patient education was given on self myofascial release techniques  - As of 1/9 her trigger points tenderness have decreased as per patient report  Exercise based HEP was given to strengthen abdominals and decrease fibromyalgia symptoms         Flowsheet Rows      Most Recent Value   PT/OT G-Codes   Current Score  63   Projected Score  69             Precautions: none      Manual  1/23/2020 1/28/2020  12/5/2019 12/10/2019 12/12/2019 12/19/2019 1/3/2020  1/9/2020   STM  [address]   STM Mike Shoulders SY GG  UB TAMARA AG RM XY  XW   Seated CTJ      [facility]                                 Exercise Diary  1/23 1/28  12/5 12/10 12/12 12/19 12/26 1/3/2020 1/9   TrA contractions    3x10 3" 3x10 3" 3x10 3" 3x10 3" 3x10 3" 3x10 3"    TrA Leg Fallouts    3x10x2 3" 3x10x2 3" 3x10x2 3"       REIP             Physioball press     OR 3x15 OR 3x15 OR 3x15 OR 3x15 OR 3x15 OR  3x15 OR 3x15   LTR    3x10 3x10        Palloff Presses Blue 3x10  Blue 3x10   Bue 3x15x2 Blue 3x15x2 Black 3x15x2 Black 3x15x2 Black 3x15x2 Black 3x15x2                Standing Rows Blue 3x15 Blue 3x15  Red 3x15 Blue 3x15 Blue 3x15 Black 3x15 Black 3x15 Black  3x15 Black 3x15   Standing Straight APD Blue 3x10 Blue 3x15  Red 3x15 Blue 3x15 Blue 3x15 Black 3x15 Black 3x15 Black   3x15 Black 3x15   Scapular retraction w/ ER  Red 3x12  Red 3x12 Grn 3x12 Grn 312 Blue Blue 3x15 Blue   3x15 Blue 3x15   Robbers Grn 3x15 Grn 3x15   Red 3x10 Grn 3x10 Grn 3x10 GRN 3x10 GRN  3x10 Grn 3x10   Thoracic roll outs 4x30" 4x30"   4x30" 4x30" 4x30" 4x30" 4x30" 4x30"                                                                                              HEP teaching and return demonstration                 Modalities              HP 10' 10'

## 2020-01-30 ENCOUNTER — APPOINTMENT (OUTPATIENT)
Dept: PHYSICAL THERAPY | Facility: MEDICAL CENTER | Age: 45
End: 2020-01-30
Payer: COMMERCIAL

## 2020-05-26 ENCOUNTER — TELEPHONE (OUTPATIENT)
Dept: FAMILY MEDICINE CLINIC | Facility: CLINIC | Age: 45
End: 2020-05-26

## 2020-05-28 ENCOUNTER — TELEMEDICINE (OUTPATIENT)
Dept: FAMILY MEDICINE CLINIC | Facility: CLINIC | Age: 45
End: 2020-05-28

## 2020-05-28 DIAGNOSIS — G43.709 CHRONIC MIGRAINE WITHOUT AURA WITHOUT STATUS MIGRAINOSUS, NOT INTRACTABLE: ICD-10-CM

## 2020-05-28 DIAGNOSIS — U07.1 COVID-19: Primary | ICD-10-CM

## 2020-05-28 PROCEDURE — T1015 CLINIC SERVICE: HCPCS | Performed by: PHYSICIAN ASSISTANT

## 2020-05-28 PROCEDURE — G2012 BRIEF CHECK IN BY MD/QHP: HCPCS | Performed by: PHYSICIAN ASSISTANT

## 2020-05-28 RX ORDER — BUTALBITAL, ACETAMINOPHEN AND CAFFEINE 50; 325; 40 MG/1; MG/1; MG/1
1 TABLET ORAL EVERY 6 HOURS PRN
Qty: 30 TABLET | Refills: 1 | Status: SHIPPED | OUTPATIENT
Start: 2020-05-28 | End: 2020-07-07 | Stop reason: SDUPTHER

## 2020-05-28 RX ORDER — FLUTICASONE PROPIONATE 50 MCG
2 SPRAY, SUSPENSION (ML) NASAL DAILY
Qty: 3 BOTTLE | Refills: 1 | Status: SHIPPED | OUTPATIENT
Start: 2020-05-28 | End: 2020-07-07 | Stop reason: SDUPTHER

## 2020-07-07 ENCOUNTER — TELEMEDICINE (OUTPATIENT)
Dept: FAMILY MEDICINE CLINIC | Facility: CLINIC | Age: 45
End: 2020-07-07

## 2020-07-07 VITALS — WEIGHT: 150 LBS | HEIGHT: 62 IN | BODY MASS INDEX: 27.6 KG/M2

## 2020-07-07 DIAGNOSIS — M25.541 JOINT PAIN IN FINGERS OF BOTH HANDS: ICD-10-CM

## 2020-07-07 DIAGNOSIS — G43.709 CHRONIC MIGRAINE WITHOUT AURA WITHOUT STATUS MIGRAINOSUS, NOT INTRACTABLE: ICD-10-CM

## 2020-07-07 DIAGNOSIS — G89.29 CHRONIC BILATERAL LOW BACK PAIN WITHOUT SCIATICA: ICD-10-CM

## 2020-07-07 DIAGNOSIS — M25.542 JOINT PAIN IN FINGERS OF BOTH HANDS: ICD-10-CM

## 2020-07-07 DIAGNOSIS — M54.12 CERVICAL RADICULOPATHY: Primary | ICD-10-CM

## 2020-07-07 DIAGNOSIS — J03.90 TONSILLITIS: ICD-10-CM

## 2020-07-07 DIAGNOSIS — M54.50 CHRONIC BILATERAL LOW BACK PAIN WITHOUT SCIATICA: ICD-10-CM

## 2020-07-07 PROCEDURE — 1036F TOBACCO NON-USER: CPT | Performed by: PHYSICIAN ASSISTANT

## 2020-07-07 PROCEDURE — 3008F BODY MASS INDEX DOCD: CPT | Performed by: PHYSICIAN ASSISTANT

## 2020-07-07 PROCEDURE — 99214 OFFICE O/P EST MOD 30 MIN: CPT | Performed by: PHYSICIAN ASSISTANT

## 2020-07-07 RX ORDER — FLUTICASONE PROPIONATE 50 MCG
2 SPRAY, SUSPENSION (ML) NASAL DAILY
Qty: 3 BOTTLE | Refills: 1 | Status: SHIPPED | OUTPATIENT
Start: 2020-07-07

## 2020-07-07 RX ORDER — CYCLOBENZAPRINE HCL 10 MG
10 TABLET ORAL
Qty: 30 TABLET | Refills: 1 | Status: SHIPPED | OUTPATIENT
Start: 2020-07-07

## 2020-07-07 RX ORDER — MELOXICAM 15 MG/1
15 TABLET ORAL DAILY
Qty: 30 TABLET | Refills: 1 | Status: SHIPPED | OUTPATIENT
Start: 2020-07-07 | End: 2021-04-14

## 2020-07-07 RX ORDER — BUTALBITAL, ACETAMINOPHEN AND CAFFEINE 50; 325; 40 MG/1; MG/1; MG/1
1 TABLET ORAL EVERY 6 HOURS PRN
Qty: 30 TABLET | Refills: 1 | Status: SHIPPED | OUTPATIENT
Start: 2020-07-07 | End: 2021-03-11 | Stop reason: SDUPTHER

## 2020-07-07 RX ORDER — AMOXICILLIN 500 MG/1
500 CAPSULE ORAL EVERY 8 HOURS SCHEDULED
Qty: 30 CAPSULE | Refills: 0 | Status: SHIPPED | OUTPATIENT
Start: 2020-07-07 | End: 2020-07-17

## 2020-07-07 NOTE — PROGRESS NOTES
Virtual Regular Visit      Assessment/Plan:    Problem List Items Addressed This Visit        Cardiovascular and Mediastinum    Migraine headache     Continue PRN fioricet           Relevant Medications    cyclobenzaprine (FLEXERIL) 10 mg tablet    fluticasone (FLONASE) 50 mcg/act nasal spray    butalbital-acetaminophen-caffeine (FIORICET,ESGIC) -40 mg per tablet    meloxicam (MOBIC) 15 mg tablet       Nervous and Auditory    Cervical radiculopathy - Primary     Recommend EMG and neck xray  To start meloxicam and flexeril at night         Relevant Orders    EMG 2 Limb Upper Extremity    XR spine cervical complete 4 or 5 vw non injury       Other    Chronic bilateral low back pain without sciatica    Relevant Medications    cyclobenzaprine (FLEXERIL) 10 mg tablet    Joint pain in fingers of both hands    Relevant Medications    meloxicam (MOBIC) 15 mg tablet      Other Visit Diagnoses     Tonsillitis        Relevant Medications    amoxicillin (AMOXIL) 500 mg capsule               Reason for visit is   Chief Complaint   Patient presents with    Sore Throat     pain in throat---    Arm Pain     right arm pain for months cant stand the pain now--was positive for COVID in May and couldnt schedule an appt  States pain is so bad she cannot sleep and its now affecting her left arm as well    Virtual Regular Visit        Encounter provider Terence Harris PA-C    Provider located at [address] [place] [id number] [phone]      Recent Visits  No visits were found meeting these conditions  Showing recent visits within past 7 days and meeting all other requirements     Today's Visits  Date Type Provider Dept   07/07/20 Telemedicine Terence Harris PA-C [facility]   Showing today's visits and meeting all other requirements     Future Appointments  No visits were found meeting these conditions     Showing future appointments within next 150 days and meeting all other requirements        The patient was identified by name and date of birth  Paradise Quintanilla was informed that this is a telemedicine visit and that the visit is being conducted through [address] and patient was informed that this is not a secure, HIPAA-complaint platform  She agrees to proceed     My office door was closed  The following individuals were in the room with me and the patient informed Carol Lima interpreting  She acknowledged consent and understanding of privacy and security of the video platform  The patient has agreed to participate and understands they can discontinue the visit at any time  Patient is aware this is a billable service  Subjective  Paradise Quintanilla is a 39 y o  female calling for bilateral UE pain and sore throat  Sore throat began 3 days ago  No fever  She feels like her glands and tonsils are swollen  No allergy symtpoms or cough/congestion  She has not taken anything for this       She has also been having neck pain for months  She was going to physical therapy for lower back the also believe that she had a pinched nerve on her right side of her neck  She has pain that shoots down her right upper extremity and she feels numbness in her right hand  Since she had COVID about 2 months ago she has started getting left finger tingling  She was prescribed meloxicam Flexeril in the past for her lower back however she never did receive it  She would also like to get refills Fioricet for migraines  She lost her insurance and was not able to get it  She gets a few migraines a month and typically does help relieve the pain        HPI     Past Medical History:   Diagnosis Date    Hypertension     Last assessed 4/29/2013        Past Surgical History:   Procedure Laterality Date    TUBAL LIGATION         Current Outpatient Medications   Medication Sig Dispense Refill    butalbital-acetaminophen-caffeine (FIORICET,ESGIC) -40 mg per tablet Take 1 tablet by mouth every 6 (six) hours as needed for headaches 30 tablet 1    amoxicillin (AMOXIL) 500 mg capsule Take 1 capsule (500 mg total) by mouth every 8 (eight) hours for 10 days 30 capsule 0    cyclobenzaprine (FLEXERIL) 10 mg tablet Take 1 tablet (10 mg total) by mouth daily at bedtime 30 tablet 1    fluticasone (FLONASE) 50 mcg/act nasal spray 2 sprays into each nostril daily 3 Bottle 1    meloxicam (MOBIC) 15 mg tablet Take 1 tablet (15 mg total) by mouth daily 30 tablet 1     No current facility-administered medications for this visit  No Known Allergies    Review of Systems   Constitutional: Negative for activity change, appetite change, chills, fatigue and unexpected weight change  HENT: Positive for sore throat  Negative for dental problem, ear pain and hearing loss  Eyes: Negative for visual disturbance  Respiratory: Negative for cough and wheezing  Cardiovascular: Negative for chest pain  Gastrointestinal: Negative for abdominal pain, constipation, diarrhea and vomiting  Genitourinary: Negative for difficulty urinating and dysuria  Musculoskeletal: Positive for neck pain and neck stiffness  Negative for arthralgias, back pain and myalgias  Skin: Negative for rash  Neurological: Positive for numbness and headaches  Negative for dizziness  Psychiatric/Behavioral: Negative for behavioral problems  Video Exam    Vitals:    07/07/20 0906   Weight: 68 kg (150 lb)   Height: 5' 1 5" (1 562 m)       Physical Exam   Constitutional: She is oriented to person, place, and time  She appears well-developed and well-nourished  HENT:   Head: Normocephalic and atraumatic  Mild redness in pharynx  Lighting not good enough to see if exudate is present   Eyes: Conjunctivae are normal    Neck:   No visible masses   Pulmonary/Chest: Effort normal    Lymphadenopathy:     She has cervical adenopathy (she states tonsillar nodes feel larger than normal and are tender)  Neurological: She is alert and oriented to person, place, and time  Psychiatric: She has a normal mood and affect  Her behavior is normal         As a result of this visit, I have not referred the patient for further respiratory evaluation  I spent 18 minutes directly with the patient during this visit      VIRTUAL VISIT DISCLAIMER    Justin Cruz acknowledges that she has consented to an online visit or consultation  She understands that the online visit is based solely on information provided by her, and that, in the absence of a face-to-face physical evaluation by the physician, the diagnosis she receives is both limited and provisional in terms of accuracy and completeness  This is not intended to replace a full medical face-to-face evaluation by the physician  Justin Cruz understands and accepts these terms

## 2020-07-08 ENCOUNTER — TELEPHONE (OUTPATIENT)
Dept: FAMILY MEDICINE CLINIC | Facility: CLINIC | Age: 45
End: 2020-07-08

## 2020-07-08 NOTE — TELEPHONE ENCOUNTER
butalbital-acetaminophen-caffeine (FIORICET,ESGIC) -40 mg       Requires a PA do you wish to proceed?

## 2020-07-22 ENCOUNTER — HOSPITAL ENCOUNTER (OUTPATIENT)
Dept: NEUROLOGY | Facility: CLINIC | Age: 45
Discharge: HOME | End: 2020-07-22
Payer: COMMERCIAL

## 2020-07-22 DIAGNOSIS — M54.12 CERVICAL RADICULOPATHY: ICD-10-CM

## 2020-07-22 PROCEDURE — 95911 NRV CNDJ TEST 9-10 STUDIES: CPT | Performed by: PSYCHIATRY & NEUROLOGY

## 2020-07-22 PROCEDURE — 95886 MUSC TEST DONE W/N TEST COMP: CPT | Performed by: PSYCHIATRY & NEUROLOGY

## 2020-07-28 NOTE — RESULT ENCOUNTER NOTE
She has bilateral carpal tunnel  I recommend first buying night splints and wearing them   If no chagne in next 6 weeks will refer to orthopedics for surgical consult

## 2020-08-27 ENCOUNTER — HOSPITAL ENCOUNTER (OUTPATIENT)
Dept: RADIOLOGY | Facility: HOSPITAL | Age: 45
Discharge: HOME | End: 2020-08-27
Payer: COMMERCIAL

## 2020-08-27 ENCOUNTER — OFFICE VISIT (OUTPATIENT)
Dept: FAMILY MEDICINE CLINIC | Facility: CLINIC | Age: 45
End: 2020-08-27

## 2020-08-27 VITALS
SYSTOLIC BLOOD PRESSURE: 138 MMHG | TEMPERATURE: 98.6 F | WEIGHT: 161.5 LBS | RESPIRATION RATE: 20 BRPM | DIASTOLIC BLOOD PRESSURE: 86 MMHG | BODY MASS INDEX: 29.72 KG/M2 | HEIGHT: 62 IN

## 2020-08-27 DIAGNOSIS — G56.03 BILATERAL CARPAL TUNNEL SYNDROME: ICD-10-CM

## 2020-08-27 DIAGNOSIS — M54.12 CERVICAL RADICULOPATHY: ICD-10-CM

## 2020-08-27 DIAGNOSIS — R59.0 CERVICAL LYMPHADENOPATHY: ICD-10-CM

## 2020-08-27 DIAGNOSIS — E66.3 OVERWEIGHT: ICD-10-CM

## 2020-08-27 DIAGNOSIS — Z12.4 CERVICAL CANCER SCREENING: Primary | ICD-10-CM

## 2020-08-27 PROCEDURE — 3008F BODY MASS INDEX DOCD: CPT | Performed by: PHYSICIAN ASSISTANT

## 2020-08-27 PROCEDURE — 1036F TOBACCO NON-USER: CPT | Performed by: PHYSICIAN ASSISTANT

## 2020-08-27 PROCEDURE — 3725F SCREEN DEPRESSION PERFORMED: CPT | Performed by: PHYSICIAN ASSISTANT

## 2020-08-27 PROCEDURE — 99214 OFFICE O/P EST MOD 30 MIN: CPT | Performed by: PHYSICIAN ASSISTANT

## 2020-08-27 PROCEDURE — 72050 X-RAY EXAM NECK SPINE 4/5VWS: CPT

## 2020-08-27 RX ORDER — GABAPENTIN 300 MG/1
300 CAPSULE ORAL
Qty: 30 CAPSULE | Refills: 2 | Status: SHIPPED | OUTPATIENT
Start: 2020-08-27

## 2020-08-27 NOTE — ASSESSMENT & PLAN NOTE
Recommend starting gabapentin at night  X-ray was ordered and she will get that today and I recommend starting physical therapy as well  Continue meloxicam and Flexeril

## 2020-08-27 NOTE — PROGRESS NOTES
Assessment/Plan:    Overweight  BMI Counseling: Body mass index is 30 02 kg/m²  The BMI is above normal  Nutrition recommendations include decreasing overall calorie intake, 3-5 servings of fruits/vegetables daily, decreasing soda and/or juice intake and moderation in carbohydrate intake  Exercise recommendations include moderate aerobic physical activity for 150 minutes/week  Cervical radiculopathy  Recommend starting gabapentin at night  X-ray was ordered and she will get that today and I recommend starting physical therapy as well  Continue meloxicam and Flexeril  Bilateral carpal tunnel syndrome  Referred to orthopedics         Problem List Items Addressed This Visit        Nervous and Auditory    Cervical radiculopathy     Recommend starting gabapentin at night  X-ray was ordered and she will get that today and I recommend starting physical therapy as well  Continue meloxicam and Flexeril  Relevant Medications    gabapentin (NEURONTIN) 300 mg capsule    Other Relevant Orders    Ambulatory referral to Physical Therapy    Bilateral carpal tunnel syndrome     Referred to orthopedics         Relevant Orders    Ambulatory referral to Orthopedic Surgery       Other    Overweight     BMI Counseling: Body mass index is 30 02 kg/m²  The BMI is above normal  Nutrition recommendations include decreasing overall calorie intake, 3-5 servings of fruits/vegetables daily, decreasing soda and/or juice intake and moderation in carbohydrate intake  Exercise recommendations include moderate aerobic physical activity for 150 minutes/week  Other Visit Diagnoses     Cervical cancer screening    -  Primary    Cervical lymphadenopathy        Relevant Orders    US head neck soft tissue            Subjective:      Patient ID: Cecilia Reyes is a 39 y o  female  HPI 39year old female here for right had chest pain and arm pain    The pain radiates up from her right arm and goes into her shoulder her neck and her right side of her chest   She is having no cough and no shortness of breath  She does notice increased pain when she is turning her head to the left  She also feels pain in the back of her shoulders  She is currently taking meloxicam and Flexeril but they have not been helping  She does have pain also in both of her wrist and numbness  She has been using a carpal tunnel brace for last month however has not helped  She did have an EMG done that did show carpal tunnel  She was supposed to get x-ray done for her neck after virtual visit but did forget  She had sore throat before COVID in May and she was having sore throat 3 weeks  She had amoxicillin last month and it did not get better  She is having swolen balls inside her neck and she feels like her glands are swollen  She is not having allergy symptoms  No acid reflux  The pain is there constantly  She feels like things are closing her neck  The following portions of the patient's history were reviewed and updated as appropriate:   She  has a past medical history of Hypertension  She   Patient Active Problem List    Diagnosis Date Noted    Bilateral carpal tunnel syndrome     Cervical radiculopathy 07/07/2020    Joint pain in fingers of both hands 09/09/2019    Overweight 09/09/2019    Allergic rhinitis 11/08/2017    Dysfunction of both eustachian tubes 05/16/2017    Vertigo 05/16/2017    Abnormal EKG 08/16/2016    Chest pain 08/16/2016    Chronic bilateral low back pain without sciatica 08/01/2014    Rosacea 05/19/2014    Difficult or painful urination 04/14/2014    Thoracic back pain 04/14/2014    Migraine headache 04/14/2014     She  has a past surgical history that includes Tubal ligation  Her family history includes Breast cancer in her cousin, cousin, family, maternal aunt, and maternal aunt; Cervical cancer in her family; Diabetes type II in her father; Hyperlipidemia in her family;  No Known Problems in her daughter, daughter, daughter, daughter, half-sister, half-sister, maternal aunt, maternal grandfather, mother, paternal aunt, paternal aunt, paternal grandfather, paternal grandmother, and sister; Stomach cancer in her family  She  reports that she has never smoked  She has never used smokeless tobacco  She reports that she does not drink alcohol or use drugs  Current Outpatient Medications   Medication Sig Dispense Refill    butalbital-acetaminophen-caffeine (FIORICET,ESGIC) -40 mg per tablet Take 1 tablet by mouth every 6 (six) hours as needed for headaches 30 tablet 1    cyclobenzaprine (FLEXERIL) 10 mg tablet Take 1 tablet (10 mg total) by mouth daily at bedtime 30 tablet 1    fluticasone (FLONASE) 50 mcg/act nasal spray 2 sprays into each nostril daily 3 Bottle 1    meloxicam (MOBIC) 15 mg tablet Take 1 tablet (15 mg total) by mouth daily 30 tablet 1    gabapentin (NEURONTIN) 300 mg capsule Take 1 capsule (300 mg total) by mouth daily at bedtime 30 capsule 2     No current facility-administered medications for this visit  Current Outpatient Medications on File Prior to Visit   Medication Sig    butalbital-acetaminophen-caffeine (FIORICET,ESGIC) -40 mg per tablet Take 1 tablet by mouth every 6 (six) hours as needed for headaches    cyclobenzaprine (FLEXERIL) 10 mg tablet Take 1 tablet (10 mg total) by mouth daily at bedtime    fluticasone (FLONASE) 50 mcg/act nasal spray 2 sprays into each nostril daily    meloxicam (MOBIC) 15 mg tablet Take 1 tablet (15 mg total) by mouth daily     No current facility-administered medications on file prior to visit  She has No Known Allergies       Review of Systems   Constitutional: Positive for unexpected weight change  Negative for activity change, appetite change, chills and fatigue  HENT: Negative for dental problem, ear pain, hearing loss and sore throat  Eyes: Negative for visual disturbance     Respiratory: Negative for cough and wheezing  Cardiovascular: Negative for chest pain  Gastrointestinal: Negative for abdominal pain, constipation, diarrhea and vomiting  Genitourinary: Negative for difficulty urinating and dysuria  Musculoskeletal: Positive for neck pain and neck stiffness  Negative for arthralgias, back pain and myalgias  Skin: Negative for rash  Neurological: Negative for dizziness and headaches  Psychiatric/Behavioral: Negative for behavioral problems  Objective:      /86 (BP Location: Left arm, Patient Position: Sitting, Cuff Size: Standard)   Temp 98 6 °F (37 °C) (Temporal)   Resp 20   Ht 5' 1 5" (1 562 m)   Wt 73 3 kg (161 lb 8 oz)   LMP 10/28/2019 Comment: tubal ligation  BMI 30 02 kg/m²          Physical Exam  Vitals signs and nursing note reviewed  Constitutional:       General: She is not in acute distress  Appearance: She is well-developed  HENT:      Head: Normocephalic and atraumatic  Right Ear: External ear normal       Left Ear: External ear normal    Eyes:      Conjunctiva/sclera: Conjunctivae normal    Neck:      Musculoskeletal: Normal range of motion and neck supple  Thyroid: No thyromegaly  Cardiovascular:      Rate and Rhythm: Normal rate and regular rhythm  Heart sounds: Normal heart sounds  No murmur  Pulmonary:      Effort: Pulmonary effort is normal  No respiratory distress  Breath sounds: Normal breath sounds  No wheezing  Musculoskeletal:         General: Tenderness (Right chest wall, right anterior shoulder ) present  No swelling, deformity or signs of injury  Comments: Full range of motion cervical spine and right shoulder but pain with turning the neck to the left or hyperextending   Lymphadenopathy:      Cervical: Cervical adenopathy (bilateral tonsillar nodes small, mobile, tender) present  Neurological:      Mental Status: She is alert and oriented to person, place, and time     Psychiatric:         Mood and Affect: Mood normal          Behavior: Behavior normal

## 2020-09-04 ENCOUNTER — HOSPITAL ENCOUNTER (OUTPATIENT)
Dept: ULTRASOUND IMAGING | Facility: HOSPITAL | Age: 45
Discharge: HOME | End: 2020-09-04
Payer: COMMERCIAL

## 2020-09-04 DIAGNOSIS — R59.0 CERVICAL LYMPHADENOPATHY: ICD-10-CM

## 2020-09-04 PROCEDURE — 76536 US EXAM OF HEAD AND NECK: CPT

## 2020-09-10 ENCOUNTER — TELEPHONE (OUTPATIENT)
Dept: FAMILY MEDICINE CLINIC | Facility: CLINIC | Age: 45
End: 2020-09-10

## 2020-09-10 NOTE — TELEPHONE ENCOUNTER
----- Message from [id number] [place], [place] [address]CARMEN sent at 9/10/2020  1:24 PM EDT -----  Regarding: ultrasound  Ultrasound of the neck came back normal   The area where she was feeling the tenderness and concerns were normal lymph nodes and nothing to be worried about

## 2020-09-11 NOTE — TELEPHONE ENCOUNTER
She is scheduled for PT which may be helpful for muscle and outside the neck  She can try using OTC antihistamine such as loratadine to see fi that helps for inside her throat

## 2020-09-11 NOTE — TELEPHONE ENCOUNTER
Pt states that her neck is still swollen and in pain  Pt is asking why is still swollen and her throat is red

## 2020-09-18 ENCOUNTER — EVALUATION (OUTPATIENT)
Dept: PHYSICAL THERAPY | Facility: REHABILITATION | Age: 45
End: 2020-09-18
Payer: COMMERCIAL

## 2020-09-18 DIAGNOSIS — M54.12 CERVICAL RADICULOPATHY: ICD-10-CM

## 2020-09-18 PROCEDURE — 97014 ELECTRIC STIMULATION THERAPY: CPT | Performed by: PHYSICAL THERAPIST

## 2020-09-18 PROCEDURE — 97162 PT EVAL MOD COMPLEX 30 MIN: CPT | Performed by: PHYSICAL THERAPIST

## 2020-09-18 NOTE — PROGRESS NOTES
PT Evaluation     Today's date: 2020  Patient name: Gavin Tello  : 1975  MRN: [id number]  Referring provider: Pat Montenegro PA-C  Dx:   Encounter Diagnosis     ICD-10-CM    1  Cervical radiculopathy  M54 12 Ambulatory referral to Physical Therapy       Start Time: 1045  Stop Time: 1140  Total time in clinic (min): 55 minutes    Assessment  Assessment details: Patient is a 39 y o  female that presents with cervical spine and right UE pain  Patient presents with decreased strength, decreased ROM, and postural abnormalitities  Patient has difficulty with lifting, reaching overhead, and any functional use of her right UE secondary to impairments  Patient would benefit from skilled physical therapy services to address impairments to maximize function  Impairments: abnormal or restricted ROM, activity intolerance, impaired physical strength, lacks appropriate home exercise program, pain with function and poor posture     Symptom irritability: highUnderstanding of Dx/Px/POC: good   Prognosis: fair  Prognosis details: chronic    Goals  Impairment:  1  Patient will reports 50% reduction in pain in 4 weeks to maximize function  2   Patient will improve strength to 4/5 in all planes to maximize function  3   Patient will improve ROM to [facility] in 4 weeks to maximize function  Functional:  1  Patient will improve FOTO by 14 points to 67/100 in 4 weeks to maximize function  2  Patient will be independent with HEP in 4 weeks to maximize function  3  Patient will report no difficulty with reaching overhead in 4 weeks to maximize function  4  Patient will report no difficulty with lifting in 4 weeks to maximize function  Plan  Plan details: Patient will be a RE in 4 weeks      Patient would benefit from: skilled physical therapy  Planned modality interventions: TENS, thermotherapy: hydrocollator packs, unattended electrical stimulation and low level laser therapy  Planned therapy interventions: activity modification, home exercise program, therapeutic exercise, strengthening, functional ROM exercises, patient education, postural training, neuromuscular re-education and manual therapy  Frequency: 2x week  Duration in visits: 9  Duration in weeks: 4  Treatment plan discussed with: patient        Subjective Evaluation    History of Present Illness  Mechanism of injury: Patient presents with cervical spine and right arm pain that started in 2019  Patient reports her entire right arm will feel like numbness and tingling  Patient reports pain is worst with movement of her right arm  She reports difficulty with anything involving her right UE  She reports her pain as an electrical current  Patient had x-ray imaging which is reported as normal   She will also get discomfort into the right chest   She reports difficulty with sleeping  Pain  At best pain ratin  At worst pain ratin  Quality: radiating and needle-like (electrical current)  Aggravating factors: lifting and overhead activity  Progression: no change    Hand dominance: right      Diagnostic Tests  X-ray: normal  Treatments  Current treatment: physical therapy  Patient Goals  Patient goals for therapy: decreased pain          Objective     Static Posture     Head  Forward  Shoulders  Rounded  Postural Observations  Seated posture: fair  Standing posture: fair        Palpation   Left   No palpable tenderness to the cervical paraspinals, levator scapulae, middle trapezius, pectoralis major, pectoralis minor, rhomboids, suboccipitals and upper trapezius  Right   Tenderness of the cervical paraspinals, levator scapulae, middle trapezius, pectoralis major, pectoralis minor, rhomboids, suboccipitals and upper trapezius  Tenderness   Cervical Spine   Tenderness in the spinous process (throughout cervical and upper thoracic spine) and right ribs (1st rib)       Active Range of Motion   Cervical/Thoracic Spine Cervical    Flexion:  WFL and with pain  Extension:  WFL and with pain  Left lateral flexion:  WFL and with pain  Right lateral flexion:  WFL and with pain  Left rotation:  WFL and with pain  Right rotation:  WFL and with pain  Left Shoulder   Flexion: 140 degrees     Right Shoulder   Flexion: 120 degrees with pain    Passive Range of Motion     Right Shoulder   External rotation 45°: WFL  Internal rotation 45°: WFL    Strength/Myotome Testing     Left Shoulder     Planes of Motion   Flexion: 3-     Right Shoulder     Planes of Motion   Flexion: 3-     Tests   Cervical   Negative lumbar distraction  Right Shoulder   Positive ULTT1         Flowsheet Rows      Most Recent Value   PT/OT G-Codes   Current Score  53   Projected Score  67             Precautions: HTN, symptoms highly irritable      Manuals 9/18            Shoulder PROM             Nerve flossing R UE                                       Neuro Re-Ed             Scapular retraction issued            Cervical retraction                                                                              Ther Ex             UBE rev             Row with band                                                                                           Ther Activity                                       Gait Training                                       Modalities             Heat seated 10 min            E-stim TENS 10 min

## 2020-09-21 ENCOUNTER — OFFICE VISIT (OUTPATIENT)
Dept: PHYSICAL THERAPY | Facility: REHABILITATION | Age: 45
End: 2020-09-21
Payer: COMMERCIAL

## 2020-09-21 DIAGNOSIS — M54.12 CERVICAL RADICULOPATHY: Primary | ICD-10-CM

## 2020-09-21 PROCEDURE — 97112 NEUROMUSCULAR REEDUCATION: CPT | Performed by: PHYSICAL THERAPIST

## 2020-09-21 PROCEDURE — 97110 THERAPEUTIC EXERCISES: CPT | Performed by: PHYSICAL THERAPIST

## 2020-09-21 PROCEDURE — 97140 MANUAL THERAPY 1/> REGIONS: CPT | Performed by: PHYSICAL THERAPIST

## 2020-09-21 PROCEDURE — 97014 ELECTRIC STIMULATION THERAPY: CPT | Performed by: PHYSICAL THERAPIST

## 2020-09-21 NOTE — PROGRESS NOTES
Daily Note     Today's date: 2020  Patient name: Felix Morrissey  : 1975  MRN: [id number]  Referring provider: Everardo Coughlin PA-C  Dx:   Encounter Diagnosis     ICD-10-CM    1  Cervical radiculopathy  M54 12        Start Time: 824  Stop Time:   Total time in clinic (min): 50 minutes    Subjective: Patient presents for first visit post IE   She reports her pain as /10 this visit  She continues to report most of her pain into right breast         Objective: See treatment diary below      Assessment: Tolerated treatment well  Patient exhibited good technique with therapeutic exercises and would benefit from continued PT  Patient tolerates initiation of therex well this visit  She is challenged by cervical retraction with form and requires cuing  Pain into right breast with shoulder PROM in all directions  Notes some relief with TENs this visit  Empty end feel with shoulder PROM all directions with pain into the chest        Plan: Progress treatment as tolerated         Precautions: HTN, symptoms highly irritable      Manuals            Shoulder PROM  5 min           Nerve flossing R UE             SRM pec insertion and UT on R  10 min                        Neuro Re-Ed             Scapular retraction issued 10" 10x           Cervical retraction  5" 10x                                                                            Ther Ex             UBE rev  4 min           Row with WS  Green 5" 25x           Shoulder ext with WS  Red 5" 10x                                                                            Ther Activity                                       Gait Training                                       Modalities             Heat seated 10 min 10 min           E-stim TENS 10 min 10 min

## 2020-09-24 ENCOUNTER — OFFICE VISIT (OUTPATIENT)
Dept: PHYSICAL THERAPY | Facility: REHABILITATION | Age: 45
End: 2020-09-24
Payer: COMMERCIAL

## 2020-09-24 DIAGNOSIS — M54.12 CERVICAL RADICULOPATHY: Primary | ICD-10-CM

## 2020-09-24 PROCEDURE — 97014 ELECTRIC STIMULATION THERAPY: CPT

## 2020-09-24 PROCEDURE — 97110 THERAPEUTIC EXERCISES: CPT

## 2020-09-24 PROCEDURE — 97140 MANUAL THERAPY 1/> REGIONS: CPT

## 2020-09-24 PROCEDURE — 97112 NEUROMUSCULAR REEDUCATION: CPT

## 2020-09-24 NOTE — PROGRESS NOTES
Daily Note     Today's date: 2020  Patient name: Jania Pink  : 1975  MRN: [id number]  Referring provider: Ileana Art PA-C  Dx:   Encounter Diagnosis     ICD-10-CM    1  Cervical radiculopathy  M54 12        Start Time: 1030  Stop Time: 1125  Total time in clinic (min): 55 minutes    Subjective: Patient reports doing good after last treatment but does continue to have pain  Pt reports pain as /10 arriving to PT this AM, primarily into R breast and ant shoulder, but also at L carpal tunnel region  Objective: See treatment diary below  Precautions: HTN, symptoms highly irritable      Manuals           Shoulder PROM  5 min 10 min          Nerve flossing R UE             SRM pec insertion and UT on R  10 min 10 min                       Neuro Re-Ed             Scapular retraction issued 10" 10x 10" x10          Cervical retraction  5" 10x 5"x10                                                                           Ther Ex             UBE rev  4 min 4 min          Row with WS  Green 5" 25x Green 5"x20          Shoulder ext with WS  Red 5" 10x Red 5"x15                                                                           Ther Activity                                       Gait Training                                       Modalities             Heat seated 10 min 10 min 10 min          E-stim TENS 10 min 10 min 10 min                 Assessment: Tolerated treatment well  Mild UT compensation with trial of scap retractions, however is able to correct with cues  Also requires cueing during cervical retraction and standing postural strengthening to prevent excessive cervical extension  Tenderness to palpation into R UT, anterior shoulder and into R pec region  Performed mild soft tissue massage with reduction in muscle tightness noted upon completion  Improved tolerance to shoulder PROM achieving greater ROM with less end range pain    Pt reports reduced pain leaving PT, assess response to treatment NV  Patient exhibited good technique with therapeutic exercises and would benefit from continued PT  Plan: Progress treatment as tolerated

## 2020-09-29 ENCOUNTER — OFFICE VISIT (OUTPATIENT)
Dept: PHYSICAL THERAPY | Facility: REHABILITATION | Age: 45
End: 2020-09-29
Payer: COMMERCIAL

## 2020-09-29 DIAGNOSIS — M54.12 CERVICAL RADICULOPATHY: Primary | ICD-10-CM

## 2020-09-29 PROCEDURE — 97110 THERAPEUTIC EXERCISES: CPT

## 2020-09-29 PROCEDURE — 97112 NEUROMUSCULAR REEDUCATION: CPT

## 2020-09-29 PROCEDURE — 97140 MANUAL THERAPY 1/> REGIONS: CPT

## 2020-09-29 PROCEDURE — 97014 ELECTRIC STIMULATION THERAPY: CPT

## 2020-09-29 NOTE — PROGRESS NOTES
Daily Note     Today's date: 2020  Patient name: Eileen Ricks  : 1975  MRN: [id number]  Referring provider: Cici Garcia PA-C  Dx:   Encounter Diagnosis     ICD-10-CM    1  Cervical radiculopathy  M54 12                   Subjective: Patient reports her pain is worse today in her right shoulder and left wrist 9/10 at this time  Objective: See treatment diary below  Precautions: HTN, symptoms highly irritable        Manuals                Shoulder PROM   5 min 10 min  10 min               Nerve flossing R UE                       SRM pec insertion and UT on R   10 min 10 min  10 min                                       Neuro Re-Ed                       Scapular retraction issued 10" 10x 10" x10  10"x10               Cervical retraction   5" 10x 5"x10  5"x10                                                                                                                                       Ther Ex                       UBE rev   4 min 4 min  4 min               Row with WS   Green 5" 25x Green 5"x20  Green 5"x20               Shoulder ext with WS   Red 5" 10x Red 5"x15  Red 5"x20                                                                                                                                       Ther Activity                                                                       Gait Training                                                                       Modalities                       Heat seated 10 min 10 min 10 min  10 min               E-stim TENS 10 min 10 min 10 min  10 min                           Assessment: Increased pec pain with most TE as well as STM  Plan: Continue per plan of care

## 2020-10-02 ENCOUNTER — OFFICE VISIT (OUTPATIENT)
Dept: PHYSICAL THERAPY | Facility: REHABILITATION | Age: 45
End: 2020-10-02
Payer: COMMERCIAL

## 2020-10-02 DIAGNOSIS — M54.12 CERVICAL RADICULOPATHY: Primary | ICD-10-CM

## 2020-10-02 PROCEDURE — 97014 ELECTRIC STIMULATION THERAPY: CPT

## 2020-10-05 ENCOUNTER — OFFICE VISIT (OUTPATIENT)
Dept: PHYSICAL THERAPY | Facility: REHABILITATION | Age: 45
End: 2020-10-05
Payer: COMMERCIAL

## 2020-10-05 DIAGNOSIS — M54.12 CERVICAL RADICULOPATHY: Primary | ICD-10-CM

## 2020-10-05 PROCEDURE — 97110 THERAPEUTIC EXERCISES: CPT | Performed by: PHYSICAL THERAPIST

## 2020-10-05 PROCEDURE — 97112 NEUROMUSCULAR REEDUCATION: CPT | Performed by: PHYSICAL THERAPIST

## 2020-10-05 PROCEDURE — 97014 ELECTRIC STIMULATION THERAPY: CPT | Performed by: PHYSICAL THERAPIST

## 2020-10-05 PROCEDURE — 97140 MANUAL THERAPY 1/> REGIONS: CPT | Performed by: PHYSICAL THERAPIST

## 2020-10-08 ENCOUNTER — OFFICE VISIT (OUTPATIENT)
Dept: PHYSICAL THERAPY | Facility: REHABILITATION | Age: 45
End: 2020-10-08
Payer: COMMERCIAL

## 2020-10-08 DIAGNOSIS — M54.12 CERVICAL RADICULOPATHY: Primary | ICD-10-CM

## 2020-10-08 PROCEDURE — 97014 ELECTRIC STIMULATION THERAPY: CPT

## 2020-10-08 PROCEDURE — 97110 THERAPEUTIC EXERCISES: CPT

## 2020-10-08 PROCEDURE — 97140 MANUAL THERAPY 1/> REGIONS: CPT

## 2020-10-08 PROCEDURE — 97112 NEUROMUSCULAR REEDUCATION: CPT

## 2020-10-12 ENCOUNTER — OFFICE VISIT (OUTPATIENT)
Dept: PHYSICAL THERAPY | Facility: REHABILITATION | Age: 45
End: 2020-10-12
Payer: COMMERCIAL

## 2020-10-12 DIAGNOSIS — M54.12 CERVICAL RADICULOPATHY: Primary | ICD-10-CM

## 2020-10-12 PROCEDURE — 97014 ELECTRIC STIMULATION THERAPY: CPT

## 2020-10-12 PROCEDURE — 97110 THERAPEUTIC EXERCISES: CPT

## 2020-10-12 PROCEDURE — 97140 MANUAL THERAPY 1/> REGIONS: CPT

## 2020-10-12 PROCEDURE — 97112 NEUROMUSCULAR REEDUCATION: CPT

## 2020-10-16 ENCOUNTER — EVALUATION (OUTPATIENT)
Dept: PHYSICAL THERAPY | Facility: REHABILITATION | Age: 45
End: 2020-10-16
Payer: COMMERCIAL

## 2020-10-16 DIAGNOSIS — M54.12 CERVICAL RADICULOPATHY: Primary | ICD-10-CM

## 2020-10-16 PROCEDURE — 97014 ELECTRIC STIMULATION THERAPY: CPT | Performed by: PHYSICAL THERAPIST

## 2020-10-16 PROCEDURE — 97140 MANUAL THERAPY 1/> REGIONS: CPT | Performed by: PHYSICAL THERAPIST

## 2020-10-16 PROCEDURE — 97110 THERAPEUTIC EXERCISES: CPT | Performed by: PHYSICAL THERAPIST

## 2020-10-20 ENCOUNTER — OFFICE VISIT (OUTPATIENT)
Dept: PHYSICAL THERAPY | Facility: REHABILITATION | Age: 45
End: 2020-10-20
Payer: COMMERCIAL

## 2020-10-20 DIAGNOSIS — M54.12 CERVICAL RADICULOPATHY: Primary | ICD-10-CM

## 2020-10-20 PROCEDURE — 97140 MANUAL THERAPY 1/> REGIONS: CPT | Performed by: PHYSICAL THERAPY ASSISTANT

## 2020-10-20 PROCEDURE — 97110 THERAPEUTIC EXERCISES: CPT | Performed by: PHYSICAL THERAPY ASSISTANT

## 2020-10-20 PROCEDURE — 97014 ELECTRIC STIMULATION THERAPY: CPT | Performed by: PHYSICAL THERAPY ASSISTANT

## 2020-10-23 ENCOUNTER — OFFICE VISIT (OUTPATIENT)
Dept: PHYSICAL THERAPY | Facility: REHABILITATION | Age: 45
End: 2020-10-23
Payer: COMMERCIAL

## 2020-10-23 DIAGNOSIS — M54.12 CERVICAL RADICULOPATHY: Primary | ICD-10-CM

## 2020-10-23 PROCEDURE — 97110 THERAPEUTIC EXERCISES: CPT

## 2020-10-23 PROCEDURE — 97014 ELECTRIC STIMULATION THERAPY: CPT

## 2020-10-23 PROCEDURE — 97140 MANUAL THERAPY 1/> REGIONS: CPT

## 2020-10-23 PROCEDURE — 97112 NEUROMUSCULAR REEDUCATION: CPT

## 2020-10-27 ENCOUNTER — OFFICE VISIT (OUTPATIENT)
Dept: PHYSICAL THERAPY | Facility: REHABILITATION | Age: 45
End: 2020-10-27
Payer: COMMERCIAL

## 2020-10-27 DIAGNOSIS — M54.12 CERVICAL RADICULOPATHY: Primary | ICD-10-CM

## 2020-10-27 PROCEDURE — 97110 THERAPEUTIC EXERCISES: CPT

## 2020-10-27 PROCEDURE — 97014 ELECTRIC STIMULATION THERAPY: CPT

## 2020-10-27 PROCEDURE — 97140 MANUAL THERAPY 1/> REGIONS: CPT

## 2020-10-27 PROCEDURE — 97112 NEUROMUSCULAR REEDUCATION: CPT

## 2020-10-30 ENCOUNTER — APPOINTMENT (OUTPATIENT)
Dept: PHYSICAL THERAPY | Facility: REHABILITATION | Age: 45
End: 2020-10-30
Payer: COMMERCIAL

## 2020-11-03 ENCOUNTER — APPOINTMENT (OUTPATIENT)
Dept: PHYSICAL THERAPY | Facility: REHABILITATION | Age: 45
End: 2020-11-03
Payer: COMMERCIAL

## 2020-11-04 ENCOUNTER — OFFICE VISIT (OUTPATIENT)
Dept: PHYSICAL THERAPY | Facility: REHABILITATION | Age: 45
End: 2020-11-04
Payer: COMMERCIAL

## 2020-11-04 DIAGNOSIS — M54.12 CERVICAL RADICULOPATHY: Primary | ICD-10-CM

## 2020-11-04 PROCEDURE — 97110 THERAPEUTIC EXERCISES: CPT

## 2020-11-04 PROCEDURE — 97140 MANUAL THERAPY 1/> REGIONS: CPT

## 2020-11-04 PROCEDURE — 97014 ELECTRIC STIMULATION THERAPY: CPT

## 2020-11-04 PROCEDURE — 97112 NEUROMUSCULAR REEDUCATION: CPT

## 2020-11-06 ENCOUNTER — APPOINTMENT (OUTPATIENT)
Dept: PHYSICAL THERAPY | Facility: REHABILITATION | Age: 45
End: 2020-11-06
Payer: COMMERCIAL

## 2020-11-10 ENCOUNTER — APPOINTMENT (OUTPATIENT)
Dept: PHYSICAL THERAPY | Facility: REHABILITATION | Age: 45
End: 2020-11-10
Payer: COMMERCIAL

## 2020-11-13 ENCOUNTER — APPOINTMENT (OUTPATIENT)
Dept: PHYSICAL THERAPY | Facility: REHABILITATION | Age: 45
End: 2020-11-13
Payer: COMMERCIAL

## 2021-03-11 ENCOUNTER — HOSPITAL ENCOUNTER (OUTPATIENT)
Dept: RADIOLOGY | Facility: HOSPITAL | Age: 46
Discharge: HOME | End: 2021-03-11
Payer: COMMERCIAL

## 2021-03-11 ENCOUNTER — OFFICE VISIT (OUTPATIENT)
Dept: FAMILY MEDICINE CLINIC | Facility: CLINIC | Age: 46
End: 2021-03-11

## 2021-03-11 VITALS
OXYGEN SATURATION: 95 % | RESPIRATION RATE: 16 BRPM | DIASTOLIC BLOOD PRESSURE: 74 MMHG | WEIGHT: 152 LBS | BODY MASS INDEX: 27.97 KG/M2 | HEART RATE: 104 BPM | SYSTOLIC BLOOD PRESSURE: 120 MMHG | TEMPERATURE: 98 F | HEIGHT: 62 IN

## 2021-03-11 DIAGNOSIS — G43.709 CHRONIC MIGRAINE WITHOUT AURA WITHOUT STATUS MIGRAINOSUS, NOT INTRACTABLE: ICD-10-CM

## 2021-03-11 DIAGNOSIS — M25.532 PAIN IN BOTH WRISTS: ICD-10-CM

## 2021-03-11 DIAGNOSIS — M25.531 PAIN IN BOTH WRISTS: ICD-10-CM

## 2021-03-11 DIAGNOSIS — M25.531 PAIN IN BOTH WRISTS: Primary | ICD-10-CM

## 2021-03-11 DIAGNOSIS — M25.532 PAIN IN BOTH WRISTS: Primary | ICD-10-CM

## 2021-03-11 DIAGNOSIS — J03.90 TONSILLITIS: ICD-10-CM

## 2021-03-11 PROCEDURE — 99214 OFFICE O/P EST MOD 30 MIN: CPT | Performed by: NURSE PRACTITIONER

## 2021-03-11 PROCEDURE — 73110 X-RAY EXAM OF WRIST: CPT

## 2021-03-11 RX ORDER — PHENTERMINE HYDROCHLORIDE 37.5 MG/1
37.5 CAPSULE ORAL EVERY MORNING
COMMUNITY
End: 2021-07-09

## 2021-03-11 RX ORDER — TOPIRAMATE 25 MG/1
25 CAPSULE, COATED PELLETS ORAL 2 TIMES DAILY
COMMUNITY
End: 2021-07-09

## 2021-03-11 RX ORDER — PREDNISONE 20 MG/1
40 TABLET ORAL DAILY
Qty: 10 TABLET | Refills: 0 | Status: SHIPPED | OUTPATIENT
Start: 2021-03-11 | End: 2021-03-16

## 2021-03-11 RX ORDER — BUTALBITAL, ACETAMINOPHEN AND CAFFEINE 50; 325; 40 MG/1; MG/1; MG/1
1 TABLET ORAL EVERY 6 HOURS PRN
Qty: 15 TABLET | Refills: 0 | Status: SHIPPED | OUTPATIENT
Start: 2021-03-11

## 2021-03-11 NOTE — PROGRESS NOTES
Assessment/Plan:    Cande Dallas was seen today for hand pain  Diagnoses and all orders for this visit:    Pain in both wrists  -     XR wrist 3+ vw right; Future  -     Diclofenac Sodium (VOLTAREN) 1 %; Apply 2 g topically 4 (four) times a day  -     XR wrist 3+ vw left; Future    Tonsillitis  -     predniSONE 20 mg tablet; Take 2 tablets (40 mg total) by mouth daily for 5 days    Chronic migraine without aura without status migrainosus, not intractable  -     butalbital-acetaminophen-caffeine (FIORICET,ESGIC) -40 mg per tablet; Take 1 tablet by mouth every 6 (six) hours as needed for headaches      Return if symptoms worsen or fail to improve  Patient Instructions     Ejercicios para el síndrome del túnel carpiano   CUIDADO AMBULATORIO:   Lo que usted necesita saber sobre los ejercicios para el síndrome del túnel carpiano: Los ejercicios para el síndrome del túnel carpiano podrían ayudar a aliviar el dolor y aumentar calle arco de movilidad  Calle médico o terapeuta físico le puede indicar con que frecuencia necesita hacer los ejercicios  Los ejercicios para síndrome del túnel carpiano:  · Extensión de los dedos: Junte el pulgar y el chan de carlos dedos  Manténgalos rectos  Coloque miya goma elástica alredlakhwinderor [employer] dedos y pulgar  Separe los dedos  Luego júntelos lentamente sin permitir que la goma elástica se caiga  Repita 40 veces  · Estiramiento del flexor de la gerald: Mantenga el Ricardo Jorge Luis recto  Agarre carlos dedos con la otra mano  Lentamente doble los dedos hacia atrás (con la mccain hacia fuera) hasta que sienta un estiramiento en la [place] [address]  Sostenga está posición por 10 segundos  Repita 5 veces  · Estiramiento del extensor de la gerald: Mantenga el Ricardo Jorge Luis recto  Agarre carlos dedos con la otra mano  Lentamente doble los dedos y la mano hacia abajo (con la mccain hacia usted) hasta que siente el estiramiento encima de la Eliezer Martinez está posición por 10 segundos  Repita 5 veces  · Rotación de la gerald sin usar pesas: Siéntese en miya silla con calle antebrazo apoyado sobre calle muslo o sobre miya mosquera  Con la mccain de calle mano hacia abajo, flexione la [address] 3 pulgadas hacia arriba y luego bájela lentamente  Voltee el antebrazo y repita con la mccain [place] arriba  Andrew cada ejercicio 20 veces  · Encogimiento de hombros: Párese con los brazos a los lados  Levante carlos hombros hacia carlos oídos y sosténgalos rober 1 hernando  Luego mueva carlos hombros hacia atrás, sammie si fuera a juntar los omóplatos  Mantenga esta posición rober 1 hernando  Luego relaje los hombros  Repita 20 veces  © Copyright [address] Information is for End User's use only and may not be sold, redistributed or otherwise used for commercial purposes  All illustrations and images included in CareNotes® are the copyrighted property of [employer]  or [address] [place] [address] es sólo para uso en educación  Calle intención no es darle un consejo médico sobre enfermedades o tratamientos  Colsulte con calle Diane Horde farmacéutico antes de seguir cualquier régimen médico para saber si es seguro y efectivo para usted  Subjective:     Elizabeth Souza is a 39 y o  female who  has a past medical history of Hypertension  who presented to the office today for same day acute visit  She is having bilateral wrist pain  She sometimes has numbness and tingling  She feels that her  can be weaker  This has been going on x several months  She thinks that she was told in the past that she has carpal tunnel  She has tried ibuprofen which is not helpful  She also thinks that her tonsils have been swollen since she had COVID last May       The following portions of the patient's history were reviewed and updated as appropriate: allergies, current medications, past family history, past medical history, past social history, past surgical history and problem list     Current Outpatient Medications on File Prior to Visit   Medication Sig Dispense Refill    phentermine 37 5 MG capsule Take 37 5 mg by mouth every morning      topiramate (TOPAMAX) 25 mg sprinkle capsule Take 25 mg by mouth 2 (two) times a day      cyclobenzaprine (FLEXERIL) 10 mg tablet Take 1 tablet (10 mg total) by mouth daily at bedtime (Patient not taking: Reported on 3/11/2021) 30 tablet 1    fluticasone (FLONASE) 50 mcg/act nasal spray 2 sprays into each nostril daily (Patient not taking: Reported on 3/11/2021) 3 Bottle 1    gabapentin (NEURONTIN) 300 mg capsule Take 1 capsule (300 mg total) by mouth daily at bedtime (Patient not taking: Reported on 3/11/2021) 30 capsule 2    meloxicam (MOBIC) 15 mg tablet Take 1 tablet (15 mg total) by mouth daily (Patient not taking: Reported on 3/11/2021) 30 tablet 1    [DISCONTINUED] butalbital-acetaminophen-caffeine (FIORICET,ESGIC) -40 mg per tablet Take 1 tablet by mouth every 6 (six) hours as needed for headaches (Patient not taking: Reported on 3/11/2021) 30 tablet 1     No current facility-administered medications on file prior to visit  Review of Systems   Constitutional: Negative for chills and fever  HENT: Negative for ear pain and sore throat  Eyes: Negative for pain and visual disturbance  Respiratory: Negative for cough and shortness of breath  Cardiovascular: Negative for chest pain and palpitations  Gastrointestinal: Negative for abdominal pain and vomiting  Genitourinary: Negative for dysuria and hematuria  Musculoskeletal: Positive for arthralgias and myalgias  Negative for back pain  Skin: Negative for color change and rash  Neurological: Negative for seizures and syncope  All other systems reviewed and are negative              Objective:    /74 (BP Location: Right arm, Patient Position: Sitting, Cuff Size: Standard)   Pulse 104   Temp 98 °F (36 7 °C) (Temporal)   Resp 16   Ht 5' 1 5" (1 562 m)   Wt 68 9 kg (152 lb)   LMP 10/28/2019 Comment: tubal ligation  SpO2 95%   BMI 28 26 kg/m²     Physical Exam  Vitals signs and nursing note reviewed  Constitutional:       General: She is not in acute distress  Appearance: She is well-developed  She is not diaphoretic  HENT:      Head: Normocephalic and atraumatic  Right Ear: External ear normal       Left Ear: External ear normal       Mouth/Throat: Tonsils: 2+ on the right  2+ on the left  Eyes:      Pupils: Pupils are equal, round, and reactive to light  Neck:      Musculoskeletal: Normal range of motion and neck supple  Cardiovascular:      Rate and Rhythm: Normal rate and regular rhythm  Pulses: Normal pulses  Heart sounds: Normal heart sounds  Pulmonary:      Effort: Pulmonary effort is normal  No respiratory distress  Breath sounds: Normal breath sounds  No wheezing  Abdominal:      General: Bowel sounds are normal  There is no distension  Palpations: Abdomen is soft  Tenderness: There is no abdominal tenderness  Musculoskeletal: Normal range of motion  General: No deformity  Right wrist: She exhibits tenderness  She exhibits normal range of motion  Left wrist: She exhibits tenderness  She exhibits normal range of motion  Lymphadenopathy:      Cervical: No cervical adenopathy  Skin:     General: Skin is warm and dry  Capillary Refill: Capillary refill takes less than 2 seconds  Findings: No rash  Neurological:      Mental Status: She is alert and oriented to person, place, and time  Sensory: No sensory deficit        Coordination: Coordination normal       Deep Tendon Reflexes: Reflexes normal    Psychiatric:         Behavior: Behavior normal          YIN Enrique  03/11/21  5:02 PM

## 2021-03-11 NOTE — PATIENT INSTRUCTIONS
Ejercicios para el síndrome del gustavo gallego   CUIDADO AMBULATORIO:   Lo que usted necesita saber sobre los ejercicios para el síndrome del gustavo gallego: Los ejercicios para el síndrome del gustavo gallego podrían ayudar a aliviar el dolor y aumentar calle arco de movilidad  Calle médico o terapeuta físico le puede indicar con que frecuencia necesita hacer los ejercicios  Los ejercicios para síndrome del gustavo gallego:  · Extensión de los dedos: Junte el pulgar y el chan de carlos dedos  Manténgalos rectos  Coloque miya goma elástica alrededor [employer] dedos y pulgar  Separe los dedos  Luego júntelos lentamente sin permitir que la goma elástica se caiga  Repita 40 veces  · Estiramiento del flexor de la gerald: Mantenga el Phyllistine Soares recto  Agarre carlos dedos con la otra mano  Lentamente doble los dedos hacia atrás (con la mccain hacia fuera) hasta que sienta un estiramiento en la [address]  Sostenga está posición por 10 segundos  Repita 5 veces  · Estiramiento del extensor de la gerald: Mantenga el Phyllistine Soares recto  Agarre carlos dedos con la otra mano  Lentamente doble los dedos y la mano hacia abajo (con la mccain hacia usted) hasta que siente el estiramiento encima de la [place]  Sostenga está posición por 10 segundos  Repita 5 veces  · Rotación de la gerald sin usar pesas: Siéntese en miya silla con calle antebrazo apoyado sobre calle muslo o sobre miya mosquera  Con la mccain de calle mano hacia abajo, flexione la [address] 3 pulgadas hacia arriba y luego bájela lentamente  Voltee el antebrazo y repita con la mccain [place] arriba  Andrew cada ejercicio 20 veces  · Encogimiento de hombros: Párese con los brazos a los lados  Levante carlos hombros hacia carlos oídos y sosténgalos rober 1 hernando  Luego mueva carlos hombros hacia atrás, sammie si fuera a juntar los omóplatos  Mantenga esta posición rober 1 hernando  Luego relaje los hombros  Repita 20 veces         © Copyright [employer] 2020 Information is for End User's use only and may not be sold, redistributed or otherwise used for commercial purposes  All illustrations and images included in CareNotes® are the copyrighted property of [employer] M [employer]  or [address] [place] [address] es sólo para uso en educación  Cruz intención no es darle un consejo médico sobre enfermedades o tratamientos  Colsulte con cruz Barbara Clipper farmacéutico antes de seguir cualquier régimen médico para saber si es seguro y efectivo para usted

## 2021-04-01 ENCOUNTER — OFFICE VISIT (OUTPATIENT)
Dept: FAMILY MEDICINE CLINIC | Facility: CLINIC | Age: 46
End: 2021-04-01

## 2021-04-01 VITALS
SYSTOLIC BLOOD PRESSURE: 120 MMHG | BODY MASS INDEX: 27.33 KG/M2 | WEIGHT: 147 LBS | DIASTOLIC BLOOD PRESSURE: 78 MMHG | HEART RATE: 106 BPM | RESPIRATION RATE: 18 BRPM | OXYGEN SATURATION: 98 % | TEMPERATURE: 97.7 F

## 2021-04-01 DIAGNOSIS — Z00.00 ANNUAL PHYSICAL EXAM: Primary | ICD-10-CM

## 2021-04-01 DIAGNOSIS — Z12.31 ENCOUNTER FOR SCREENING MAMMOGRAM FOR MALIGNANT NEOPLASM OF BREAST: ICD-10-CM

## 2021-04-01 DIAGNOSIS — Z11.1 SCREENING FOR TUBERCULOSIS: ICD-10-CM

## 2021-04-01 DIAGNOSIS — Z11.1 SCREENING FOR TUBERCULOSIS: Primary | ICD-10-CM

## 2021-04-01 DIAGNOSIS — M65.4 TENOSYNOVITIS, DE QUERVAIN: ICD-10-CM

## 2021-04-01 PROCEDURE — 99396 PREV VISIT EST AGE 40-64: CPT | Performed by: FAMILY MEDICINE

## 2021-04-01 NOTE — PROGRESS NOTES
[facility] PRACTICE TEJINDER    NAME: Sonya Brown  AGE: 39 y o  SEX: female  : 1975     DATE: 2021     Assessment and Plan:     Problem List Items Addressed This Visit        Musculoskeletal and Integument    Tenosynovitis, de Quervain     Left wrist tenderness radial side with positive Finkelstein test  She has an appointment with orthopedics later this month  Advised to take ibuprofen as needed for symptomatic relief in addition to wearing a wrist brace  Other    Annual physical exam - Primary    BMI 27 0-27 9,adult    Screening for tuberculosis     Needs TB screening for work  She will return tomorrow so that it can be read Monday morning  Encounter for screening mammogram for malignant neoplasm of breast     Mammogram was normal in 2019  Recommendation is to repeat mammogram every 1-2 years  Counseling:  Dental Health: discussed importance of regular tooth brushing, flossing, and dental visits  · Exercise: the importance of regular exercise/physical activity was discussed  Recommend exercise 3-5 times per week for at least 30 minutes  Return in 3 months (on 2021)  Chief Complaint:     Chief Complaint   Patient presents with    Physical Exam     and ppd      History of Present Illness:     Adult Annual Physical   Patient here for a comprehensive physical exam  The patient reports no problems  Diet and Physical Activity  · Diet/Nutrition: well balanced diet  · Exercise: 5-7 times a week on average  Depression Screening  PHQ-9 Depression Screening    PHQ-9:   Frequency of the following problems over the past two weeks:      Little interest or pleasure in doing things: 0 - not at all  Feeling down, depressed, or hopeless: 0 - not at all  PHQ-2 Score: 0       General Health  · Sleep: sleeps well  · Hearing: no hearing problems    · Vision: no vision problems  · Dental: regular dental visits  /GYN Health  · Patient is: s/p hysterectomy Feb 2020 for fibroids  · Last menstrual period: Jan 2020  · Contraceptive method: s/p hysterectomy  Review of Systems:     Review of Systems   Constitutional: Negative for fatigue and fever  Respiratory: Negative for cough and shortness of breath  Cardiovascular: Negative for chest pain and palpitations  Gastrointestinal: Negative for abdominal pain  Musculoskeletal: Positive for arthralgias (wrist pain L>R)  Negative for back pain and myalgias  Skin: Negative for rash  Neurological: Negative for dizziness and weakness        Past Medical History:     Past Medical History:   Diagnosis Date    Hypertension     Last assessed 4/29/2013       Past Surgical History:     Past Surgical History:   Procedure Laterality Date    TUBAL LIGATION        Social History:        Social History     Socioeconomic History    Marital status:      Spouse name: None    Number of children: None    Years of education: None    Highest education level: None   Occupational History    None   Social Needs    Financial resource strain: None    Food insecurity     Worry: None     Inability: None    Transportation needs     Medical: None     Non-medical: None   Tobacco Use    Smoking status: Never Smoker    Smokeless tobacco: Never Used   Substance and Sexual Activity    Alcohol use: No    Drug use: No    Sexual activity: Yes     Partners: Male     Birth control/protection: None   Lifestyle    Physical activity     Days per week: None     Minutes per session: None    Stress: None   Relationships    Social connections     Talks on phone: None     Gets together: None     Attends [employer] service: None     Active member of club or organization: None     Attends meetings of clubs or organizations: None     Relationship status: None    Intimate partner violence     Fear of current or ex partner: None Emotionally abused: None     Physically abused: None     Forced sexual activity: None   Other Topics Concern    None   Social History Narrative    None      Family History:     Family History   Problem Relation Age of Onset    Diabetes type II Father     Breast cancer Family     Cervical cancer Family     Stomach cancer Family     Hyperlipidemia Family     No Known Problems Mother     No Known Problems Sister     No Known Problems Daughter     No Known Problems Maternal Grandfather     No Known Problems Paternal Grandmother     No Known Problems Paternal Grandfather     No Known Problems Half-Sister     No Known Problems Half-Sister     No Known Problems Daughter     No Known Problems Daughter     No Known Problems Daughter     Breast cancer Maternal Aunt     Breast cancer Maternal Aunt     No Known Problems Paternal Aunt     No Known Problems Paternal Aunt     Breast cancer Cousin     No Known Problems Maternal Aunt     Breast cancer Cousin       Current Medications:     Current Outpatient Medications   Medication Sig Dispense Refill    butalbital-acetaminophen-caffeine (FIORICET,ESGIC) -40 mg per tablet Take 1 tablet by mouth every 6 (six) hours as needed for headaches 15 tablet 0    phentermine 37 5 MG capsule Take 37 5 mg by mouth every morning      topiramate (TOPAMAX) 25 mg sprinkle capsule Take 25 mg by mouth 2 (two) times a day      cyclobenzaprine (FLEXERIL) 10 mg tablet Take 1 tablet (10 mg total) by mouth daily at bedtime (Patient not taking: Reported on 3/11/2021) 30 tablet 1    Diclofenac Sodium (VOLTAREN) 1 % Apply 2 g topically 4 (four) times a day (Patient not taking: Reported on 4/1/2021) 100 g 2    fluticasone (FLONASE) 50 mcg/act nasal spray 2 sprays into each nostril daily (Patient not taking: Reported on 3/11/2021) 3 Bottle 1    gabapentin (NEURONTIN) 300 mg capsule Take 1 capsule (300 mg total) by mouth daily at bedtime (Patient not taking: Reported on 3/11/2021) 30 capsule 2    meloxicam (MOBIC) 15 mg tablet Take 1 tablet (15 mg total) by mouth daily (Patient not taking: Reported on 3/11/2021) 30 tablet 1     No current facility-administered medications for this visit  Allergies:     No Known Allergies   Physical Exam:     /78 (BP Location: Left arm, Patient Position: Sitting, Cuff Size: Standard)   Pulse (!) 106   Temp 97 7 °F (36 5 °C) (Temporal)   Resp 18   Wt 66 7 kg (147 lb)   LMP 10/28/2019 Comment: tubal ligation  SpO2 98%   BMI 27 33 kg/m²     Physical Exam  Vitals signs reviewed  Constitutional:       General: She is not in acute distress  HENT:      Head: Normocephalic  Right Ear: Tympanic membrane normal       Left Ear: Tympanic membrane normal       Nose: No congestion  Mouth/Throat:      Mouth: Mucous membranes are moist       Pharynx: No oropharyngeal exudate or posterior oropharyngeal erythema  Eyes:      General: No scleral icterus  Conjunctiva/sclera: Conjunctivae normal       Pupils: Pupils are equal, round, and reactive to light  Neck:      Musculoskeletal: Neck supple  Cardiovascular:      Rate and Rhythm: Normal rate  Heart sounds: Normal heart sounds  No murmur  Pulmonary:      Effort: Pulmonary effort is normal  No respiratory distress  Breath sounds: Normal breath sounds  No wheezing  Abdominal:      General: Bowel sounds are normal       Palpations: Abdomen is soft  Tenderness: There is no abdominal tenderness  Musculoskeletal:         General: Tenderness (radial side of the left wrist; Finkelstein test reproduces pain) present  No deformity  Lymphadenopathy:      Cervical: No cervical adenopathy  Skin:     Findings: No rash  Neurological:      Mental Status: She is alert and oriented to person, place, and time        Gait: Gait normal    Psychiatric:         Mood and Affect: Mood normal           MD Katie Colemnamakayla 72

## 2021-04-01 NOTE — ASSESSMENT & PLAN NOTE
Left wrist tenderness radial side with positive Finkelstein test  She has an appointment with orthopedics later this month  Advised to take ibuprofen as needed for symptomatic relief in addition to wearing a wrist brace

## 2021-04-02 ENCOUNTER — CLINICAL SUPPORT (OUTPATIENT)
Dept: FAMILY MEDICINE CLINIC | Facility: CLINIC | Age: 46
End: 2021-04-02

## 2021-04-02 DIAGNOSIS — Z11.1 ENCOUNTER FOR PPD TEST: Primary | ICD-10-CM

## 2021-04-02 PROCEDURE — 86580 TB INTRADERMAL TEST: CPT

## 2021-04-05 ENCOUNTER — CLINICAL SUPPORT (OUTPATIENT)
Dept: FAMILY MEDICINE CLINIC | Facility: CLINIC | Age: 46
End: 2021-04-05

## 2021-04-05 DIAGNOSIS — Z11.1 ENCOUNTER FOR PPD SKIN TEST READING: Primary | ICD-10-CM

## 2021-04-05 LAB
INDURATION: 0 MM
TB SKIN TEST: NEGATIVE

## 2021-04-14 ENCOUNTER — OFFICE VISIT (OUTPATIENT)
Dept: OBGYN CLINIC | Facility: MEDICAL CENTER | Age: 46
End: 2021-04-14
Payer: COMMERCIAL

## 2021-04-14 VITALS
BODY MASS INDEX: 27.38 KG/M2 | WEIGHT: 145 LBS | DIASTOLIC BLOOD PRESSURE: 87 MMHG | HEART RATE: 106 BPM | HEIGHT: 61 IN | SYSTOLIC BLOOD PRESSURE: 128 MMHG | TEMPERATURE: 97.5 F

## 2021-04-14 DIAGNOSIS — S69.81XA INJURY OF TRIANGULAR FIBROCARTILAGE COMPLEX (TFCC) OF RIGHT WRIST, INITIAL ENCOUNTER: Primary | ICD-10-CM

## 2021-04-14 DIAGNOSIS — M65.4 DE QUERVAIN'S TENOSYNOVITIS, LEFT: ICD-10-CM

## 2021-04-14 PROCEDURE — 1036F TOBACCO NON-USER: CPT | Performed by: PHYSICIAN ASSISTANT

## 2021-04-14 PROCEDURE — 99244 OFF/OP CNSLTJ NEW/EST MOD 40: CPT | Performed by: PHYSICIAN ASSISTANT

## 2021-04-14 PROCEDURE — 20550 NJX 1 TENDON SHEATH/LIGAMENT: CPT | Performed by: PHYSICIAN ASSISTANT

## 2021-04-14 RX ORDER — BETAMETHASONE SODIUM PHOSPHATE AND BETAMETHASONE ACETATE 3; 3 MG/ML; MG/ML
3 INJECTION, SUSPENSION INTRA-ARTICULAR; INTRALESIONAL; INTRAMUSCULAR; SOFT TISSUE
Status: COMPLETED | OUTPATIENT
Start: 2021-04-14 | End: 2021-04-14

## 2021-04-14 RX ORDER — NAPROXEN 500 MG/1
500 TABLET ORAL 2 TIMES DAILY WITH MEALS
Qty: 28 TABLET | Refills: 0 | Status: SHIPPED | OUTPATIENT
Start: 2021-04-14 | End: 2021-04-28

## 2021-04-14 RX ORDER — LIDOCAINE HYDROCHLORIDE 10 MG/ML
0.5 INJECTION, SOLUTION INFILTRATION; PERINEURAL
Status: COMPLETED | OUTPATIENT
Start: 2021-04-14 | End: 2021-04-14

## 2021-04-14 RX ADMIN — LIDOCAINE HYDROCHLORIDE 0.5 ML: 10 INJECTION, SOLUTION INFILTRATION; PERINEURAL at 12:10

## 2021-04-14 RX ADMIN — BETAMETHASONE SODIUM PHOSPHATE AND BETAMETHASONE ACETATE 3 MG: 3; 3 INJECTION, SUSPENSION INTRA-ARTICULAR; INTRALESIONAL; INTRAMUSCULAR; SOFT TISSUE at 12:10

## 2021-04-14 NOTE — PROGRESS NOTES
Chief Complaint     Left>right wrist pain      History of Present Illness     Truitt Heimlich is a 39 y o  female who presents to the office today for evaluation of her bilateral wrist pain, left > right  I am being asked to see this patient in consultation at the request of Terence Harris PA-C  Patient states she started having pain in the left wrist approximately 3 months ago, no trauma  This is along the radial aspect, worse with gripping  As for her right side, this began 1 month ago  Again, no injury  Denies clicking/popping sensation  Patient does have history of fibromyalgia, she does not take anything for this  She has tried using what sounds like thumb spica braces for both sides without much relief  She will take Ibuprofen occasionally as well  Patient states she was previously diagnosed with carpal tunnel in her hands years ago, but states while she used to have numbness in the hands, this is a rare occurrence now  Patient is currently working as a care worker         Past Medical History:   Diagnosis Date    Hypertension     Last assessed 4/29/2013        Past Surgical History:   Procedure Laterality Date    TUBAL LIGATION         No Known Allergies    Current Outpatient Medications on File Prior to Visit   Medication Sig Dispense Refill    butalbital-acetaminophen-caffeine (FIORICET,ESGIC) -40 mg per tablet Take 1 tablet by mouth every 6 (six) hours as needed for headaches 15 tablet 0    phentermine 37 5 MG capsule Take 37 5 mg by mouth every morning      topiramate (TOPAMAX) 25 mg sprinkle capsule Take 25 mg by mouth 2 (two) times a day      cyclobenzaprine (FLEXERIL) 10 mg tablet Take 1 tablet (10 mg total) by mouth daily at bedtime (Patient not taking: Reported on 3/11/2021) 30 tablet 1    Diclofenac Sodium (VOLTAREN) 1 % Apply 2 g topically 4 (four) times a day (Patient not taking: Reported on 4/1/2021) 100 g 2    fluticasone (FLONASE) 50 mcg/act nasal spray 2 sprays into each nostril daily (Patient not taking: Reported on 3/11/2021) 3 Bottle 1    gabapentin (NEURONTIN) 300 mg capsule Take 1 capsule (300 mg total) by mouth daily at bedtime (Patient not taking: Reported on 3/11/2021) 30 capsule 2    [DISCONTINUED] meloxicam (MOBIC) 15 mg tablet Take 1 tablet (15 mg total) by mouth daily (Patient not taking: Reported on 3/11/2021) 30 tablet 1     No current facility-administered medications on file prior to visit  Social History     Tobacco Use    Smoking status: Never Smoker    Smokeless tobacco: Never Used   Substance Use Topics    Alcohol use: No    Drug use: No       Family History   Problem Relation Age of Onset    Diabetes type II Father     Breast cancer Family     Cervical cancer Family     Stomach cancer Family     Hyperlipidemia Family     No Known Problems Mother     No Known Problems Sister     No Known Problems Daughter     No Known Problems Maternal Grandfather     No Known Problems Paternal Grandmother     No Known Problems Paternal Grandfather     No Known Problems Half-Sister     No Known Problems Half-Sister     No Known Problems Daughter     No Known Problems Daughter     No Known Problems Daughter     Breast cancer Maternal Aunt     Breast cancer Maternal Aunt     No Known Problems Paternal Aunt     No Known Problems Paternal Aunt     Breast cancer Cousin     No Known Problems Maternal Aunt     Breast cancer Cousin        Review of Systems     As stated in the HPI  All other systems were reviewed and are negative  Physical Exam     /87   Pulse (!) 106   Temp 97 5 °F (36 4 °C)   Ht 5' 1" (1 549 m)   Wt 65 8 kg (145 lb)   LMP 10/28/2019 Comment: tubal ligation  BMI 27 40 kg/m²     GENERAL: This is a well-developed, well-nourished, age-appropriate patient in no acute distress  The patient is alert and oriented x3  Pleasant and cooperative  Eyes: Anicteric sclerae  Extraocular movements appear intact    HENT: Nares are patent with no drainage  Lungs: There is equal chest rise on inspection  Breathing is non-labored with no audible wheezing  Cardiovascular: No cyanosis  No upper extremity lymphadema  Skin: Skin is warm to touch  No obvious skin lesions or rashes other than described below  Neurologic: No ataxia  Psychiatric: Mood and affect are appropriate  Left Upper Extremity:  Patient with + edema along 1st dorsal compartment  She has tenderness to palpation 1st dorsal compartment  No tenderness to radiocarpal joint or thumb CMC joint  Positive Finkelstein's test   5/5 Motor to the APB, FDI, FDP2, FDP5, EDC  Sensation intact to light touch in the median, radial, and ulnar nerve distribution  Radial pulse 2+  Right Upper Extremity:  No edema, ecchymosis, erythema  Patient describes tenderness to palpation ulnar fovea > ECU  Negative ECU Synergy test   No obvious click with TFCC circumduction  Full wrist flexion/extension and pronation/supination  5/5 Motor to the APB, FDI, FDP2, FDP5, EDC  Sensation intact to light touch in the median, radial, and ulnar nerve distribution  Radial pulse 2+  Data Review     Results Reviewed     None             Imaging:  Previous xrays of bilateral wrists from 03/11/21 were personally reviewed by me and show small cystic change of the lunate and perhaps some scapholunate widening on the right  No obvious osseous abnormality on the left  No degenerative changes  Assessment and Plan      Diagnoses and all orders for this visit:    Injury of triangular fibrocartilage complex (TFCC) of right wrist, initial encounter  -     naproxen (NAPROSYN) 500 mg tablet; Take 1 tablet (500 mg total) by mouth 2 (two) times a day with meals for 14 days  -     Cock Up Wrist Splint    De Quervain's tenosynovitis, left       39year old female with right DeQuervain's tenosynovitis  And likely TFCC injury  We discussed the etiology and natural history of de Quervain tenosynovitis    We discussed that this is related to inflammation in the 1st dorsal compartment which contains tendons that help extend and abduct the thumb  Treatment options include nonsurgical and surgical modalities  Nonsurgical modalities involve thumb spica splinting and corticosteroid injections  Corticosteroid injections typically have a benefit in about 70% of people, though if there is an anatomic variant with a subsheath in the 1st dorsal compartment, corticosteroid injections have been shown to be less effective  Surgical options include release of the 1st dorsal compartment, but this is typically reserved for people who have tried and not seen sustained benefit from nonoperative approaches  Patient elects to proceed with steroid injection here  In addition, will have the patient wear her thumb spica splint at all times other than hygiene for 2 weeks  Patient also with left wrist likely TFCC injury  For this, patient elects to proceed with nonoperative management to include wearing a brace like a cast x 1 month, keep it on at all times other than hygiene  I did change her brace to volar wrist brace so she can have her thumb back and it will make it easier to use the hand while in the brace  In addition, I suggested a 2 week course of naproxen 500mg BID with food  I reviewed medical history with the patient and her medications - I advised her that phentermine has high risks associated with it, including cardiac disease  Patient mentioned she does get occasional chest pain and sometimes feels like her heart skips a beat  I advised her to stop this medication and call her prescribing physician immediately  Follow Up: 4 weeks    To Do Next Visit: Reevaluate symptoms    PROCEDURES PERFORMED:  Hand/upper extremity injection: L extensor compartment 1  Universal Protocol:  Consent: Verbal consent obtained    Risks and benefits: risks, benefits and alternatives were discussed  Consent given by: patient  Time out: Immediately prior to procedure a "time out" was called to verify the correct patient, procedure, equipment, support staff and site/side marked as required    Patient understanding: patient states understanding of the procedure being performed  Patient identity confirmed: verbally with patient    Supporting Documentation  Indications: tendon swelling and pain   Procedure Details  Condition:de Quervain's tenosynovitis Site: L extensor compartment 1   Needle size: 25 G  Ultrasound guidance: no  Approach: radial  Medications administered: 0 5 mL lidocaine 1 %; 3 mg betamethasone acetate-betamethasone sodium phosphate 6 (3-3) mg/mL    Patient tolerance: patient tolerated the procedure well with no immediate complications  Dressing:  Sterile dressing applied

## 2021-04-14 NOTE — LETTER
April 14, 2021     Julissa Guerra PA-C  [address]  [address] [facility]    Patient: Latosha Cisse   YOB: 1975   Date of Visit: 4/14/2021       Dear Dr Yari Scott: Thank you for referring Latosha Cisse to me for evaluation  Below are my notes for this consultation  If you have questions, please do not hesitate to call me  I look forward to following your patient along with you  Sincerely,        Charan Desai MD        CC: No Recipients  Charan Desai MD  4/14/2021  7:51 PM  Sign when Signing Visit  Chief Complaint     Left>right wrist pain      History of Present Illness     Latosha Cisse is a 39 y o  female who presents to the office today for evaluation of her bilateral wrist pain, left > right  I am being asked to see this patient in consultation at the request of Terence Harris PA-C  Patient states she started having pain in the left wrist approximately 3 months ago, no trauma  This is along the radial aspect, worse with gripping  As for her right side, this began 1 month ago  Again, no injury  Denies clicking/popping sensation  Patient does have history of fibromyalgia, she does not take anything for this  She has tried using what sounds like thumb spica braces for both sides without much relief  She will take Ibuprofen occasionally as well  Patient states she was previously diagnosed with carpal tunnel in her hands years ago, but states while she used to have numbness in the hands, this is a rare occurrence now  Patient is currently working as a care worker         Past Medical History:   Diagnosis Date    Hypertension     Last assessed 4/29/2013        Past Surgical History:   Procedure Laterality Date    TUBAL LIGATION         No Known Allergies    Current Outpatient Medications on File Prior to Visit   Medication Sig Dispense Refill    butalbital-acetaminophen-caffeine (FIORICET,ESGIC) -40 mg per tablet Take 1 tablet by mouth every 6 (six) hours as needed for headaches 15 tablet 0    phentermine 37 5 MG capsule Take 37 5 mg by mouth every morning      topiramate (TOPAMAX) 25 mg sprinkle capsule Take 25 mg by mouth 2 (two) times a day      cyclobenzaprine (FLEXERIL) 10 mg tablet Take 1 tablet (10 mg total) by mouth daily at bedtime (Patient not taking: Reported on 3/11/2021) 30 tablet 1    Diclofenac Sodium (VOLTAREN) 1 % Apply 2 g topically 4 (four) times a day (Patient not taking: Reported on 4/1/2021) 100 g 2    fluticasone (FLONASE) 50 mcg/act nasal spray 2 sprays into each nostril daily (Patient not taking: Reported on 3/11/2021) 3 Bottle 1    gabapentin (NEURONTIN) 300 mg capsule Take 1 capsule (300 mg total) by mouth daily at bedtime (Patient not taking: Reported on 3/11/2021) 30 capsule 2    [DISCONTINUED] meloxicam (MOBIC) 15 mg tablet Take 1 tablet (15 mg total) by mouth daily (Patient not taking: Reported on 3/11/2021) 30 tablet 1     No current facility-administered medications on file prior to visit          Social History     Tobacco Use    Smoking status: Never Smoker    Smokeless tobacco: Never Used   Substance Use Topics    Alcohol use: No    Drug use: No       Family History   Problem Relation Age of Onset    Diabetes type II Father     Breast cancer Family     Cervical cancer Family     Stomach cancer Family     Hyperlipidemia Family     No Known Problems Mother     No Known Problems Sister     No Known Problems Daughter     No Known Problems Maternal Grandfather     No Known Problems Paternal Grandmother     No Known Problems Paternal Grandfather     No Known Problems Half-Sister     No Known Problems Half-Sister     No Known Problems Daughter     No Known Problems Daughter     No Known Problems Daughter     Breast cancer Maternal Aunt     Breast cancer Maternal Aunt     No Known Problems Paternal Aunt     No Known Problems Paternal Aunt     Breast cancer Cousin     No Known Problems Maternal Aunt     Breast cancer Cousin        Review of Systems     As stated in the HPI  All other systems were reviewed and are negative  Physical Exam     /87   Pulse (!) 106   Temp 97 5 °F (36 4 °C)   Ht 5' 1" (1 549 m)   Wt 65 8 kg (145 lb)   LMP 10/28/2019 Comment: tubal ligation  BMI 27 40 kg/m²     GENERAL: This is a well-developed, well-nourished, age-appropriate patient in no acute distress  The patient is alert and oriented x3  Pleasant and cooperative  Eyes: Anicteric sclerae  Extraocular movements appear intact  HENT: Nares are patent with no drainage  Lungs: There is equal chest rise on inspection  Breathing is non-labored with no audible wheezing  Cardiovascular: No cyanosis  No upper extremity lymphadema  Skin: Skin is warm to touch  No obvious skin lesions or rashes other than described below  Neurologic: No ataxia  Psychiatric: Mood and affect are appropriate  Left Upper Extremity:  Patient with + edema along 1st dorsal compartment  She has tenderness to palpation 1st dorsal compartment  No tenderness to radiocarpal joint or thumb CMC joint  Positive Finkelstein's test   5/5 Motor to the APB, FDI, FDP2, FDP5, EDC  Sensation intact to light touch in the median, radial, and ulnar nerve distribution  Radial pulse 2+  Right Upper Extremity:  No edema, ecchymosis, erythema  Patient describes tenderness to palpation ulnar fovea > ECU  Negative ECU Synergy test   No obvious click with TFCC circumduction  Full wrist flexion/extension and pronation/supination  5/5 Motor to the APB, FDI, FDP2, FDP5, EDC  Sensation intact to light touch in the median, radial, and ulnar nerve distribution  Radial pulse 2+  Data Review     Results Reviewed     None             Imaging:  Previous xrays of bilateral wrists from 03/11/21 were personally reviewed by me and show small cystic change of the lunate and perhaps some scapholunate widening on the right  No obvious osseous abnormality on the left   No degenerative changes  Assessment and Plan      Diagnoses and all orders for this visit:    Injury of triangular fibrocartilage complex (TFCC) of right wrist, initial encounter  -     naproxen (NAPROSYN) 500 mg tablet; Take 1 tablet (500 mg total) by mouth 2 (two) times a day with meals for 14 days  -     Cock Up Wrist Splint    De Quervain's tenosynovitis, left       39year old female with right DeQuervain's tenosynovitis  And likely TFCC injury  We discussed the etiology and natural history of de Quervain tenosynovitis  We discussed that this is related to inflammation in the 1st dorsal compartment which contains tendons that help extend and abduct the thumb  Treatment options include nonsurgical and surgical modalities  Nonsurgical modalities involve thumb spica splinting and corticosteroid injections  Corticosteroid injections typically have a benefit in about 70% of people, though if there is an anatomic variant with a subsheath in the 1st dorsal compartment, corticosteroid injections have been shown to be less effective  Surgical options include release of the 1st dorsal compartment, but this is typically reserved for people who have tried and not seen sustained benefit from nonoperative approaches  Patient elects to proceed with steroid injection here  In addition, will have the patient wear her thumb spica splint at all times other than hygiene for 2 weeks  Patient also with left wrist likely TFCC injury  For this, patient elects to proceed with nonoperative management to include wearing a brace like a cast x 1 month, keep it on at all times other than hygiene  I did change her brace to volar wrist brace so she can have her thumb back and it will make it easier to use the hand while in the brace  In addition, I suggested a 2 week course of naproxen 500mg BID with food        I reviewed medical history with the patient and her medications - I advised her that phentermine has high risks associated with it, including cardiac disease  Patient mentioned she does get occasional chest pain and sometimes feels like her heart skips a beat  I advised her to stop this medication and call her prescribing physician immediately  Follow Up: 4 weeks    To Do Next Visit: Reevaluate symptoms    PROCEDURES PERFORMED:  Hand/upper extremity injection: L extensor compartment 1  Universal Protocol:  Consent: Verbal consent obtained  Risks and benefits: risks, benefits and alternatives were discussed  Consent given by: patient  Time out: Immediately prior to procedure a "time out" was called to verify the correct patient, procedure, equipment, support staff and site/side marked as required    Patient understanding: patient states understanding of the procedure being performed  Patient identity confirmed: verbally with patient    Supporting Documentation  Indications: tendon swelling and pain   Procedure Details  Condition:de Quervain's tenosynovitis Site: L extensor compartment 1   Needle size: 25 G  Ultrasound guidance: no  Approach: radial  Medications administered: 0 5 mL lidocaine 1 %; 3 mg betamethasone acetate-betamethasone sodium phosphate 6 (3-3) mg/mL    Patient tolerance: patient tolerated the procedure well with no immediate complications  Dressing:  Sterile dressing applied

## 2021-04-19 ENCOUNTER — CLINICAL SUPPORT (OUTPATIENT)
Dept: FAMILY MEDICINE CLINIC | Facility: CLINIC | Age: 46
End: 2021-04-19

## 2021-04-19 DIAGNOSIS — Z23 ENCOUNTER FOR IMMUNIZATION: Primary | ICD-10-CM

## 2021-04-19 PROCEDURE — 86580 TB INTRADERMAL TEST: CPT

## 2021-04-21 ENCOUNTER — CLINICAL SUPPORT (OUTPATIENT)
Dept: FAMILY MEDICINE CLINIC | Facility: CLINIC | Age: 46
End: 2021-04-21

## 2021-04-21 DIAGNOSIS — Z11.1 ENCOUNTER FOR PPD SKIN TEST READING: Primary | ICD-10-CM

## 2021-04-21 LAB
INDURATION: 0 MM
TB SKIN TEST: NEGATIVE

## 2021-04-21 NOTE — ASSESSMENT & PLAN NOTE
BMI Counseling: Body mass index is 30 02 kg/m²  The BMI is above normal  Nutrition recommendations include decreasing overall calorie intake, 3-5 servings of fruits/vegetables daily, decreasing soda and/or juice intake and moderation in carbohydrate intake  Exercise recommendations include moderate aerobic physical activity for 150 minutes/week  details… detailed exam

## 2021-04-27 RX ORDER — TOPIRAMATE 25 MG/1
TABLET ORAL
COMMUNITY
Start: 2021-03-29 | End: 2021-07-09

## 2021-04-28 ENCOUNTER — OFFICE VISIT (OUTPATIENT)
Dept: FAMILY MEDICINE CLINIC | Facility: CLINIC | Age: 46
End: 2021-04-28

## 2021-04-28 VITALS
HEART RATE: 88 BPM | SYSTOLIC BLOOD PRESSURE: 102 MMHG | HEIGHT: 61 IN | DIASTOLIC BLOOD PRESSURE: 60 MMHG | RESPIRATION RATE: 16 BRPM | OXYGEN SATURATION: 99 % | TEMPERATURE: 97.6 F | WEIGHT: 152 LBS | BODY MASS INDEX: 28.7 KG/M2

## 2021-04-28 DIAGNOSIS — M54.6 ACUTE LEFT-SIDED THORACIC BACK PAIN: ICD-10-CM

## 2021-04-28 DIAGNOSIS — E66.3 OVERWEIGHT: ICD-10-CM

## 2021-04-28 DIAGNOSIS — R07.9 CHEST PAIN, UNSPECIFIED TYPE: ICD-10-CM

## 2021-04-28 PROCEDURE — 3008F BODY MASS INDEX DOCD: CPT | Performed by: PHYSICIAN ASSISTANT

## 2021-04-28 PROCEDURE — 99214 OFFICE O/P EST MOD 30 MIN: CPT | Performed by: PHYSICIAN ASSISTANT

## 2021-04-28 RX ORDER — TIZANIDINE 2 MG/1
2 TABLET ORAL EVERY 8 HOURS PRN
Qty: 30 TABLET | Refills: 0 | Status: SHIPPED | OUTPATIENT
Start: 2021-04-28

## 2021-04-28 NOTE — PROGRESS NOTES
Assessment/Plan:    Overweight  BMI Counseling: Body mass index is 28 72 kg/m²  The BMI is above normal  Nutrition recommendations include 3-5 servings of fruits/vegetables daily and increasing intake of lean protein  Problem List Items Addressed This Visit        Other    Chest pain    Relevant Orders    CBC and differential    Thoracic back pain    Relevant Medications    tiZANidine (ZANAFLEX) 2 mg tablet    Overweight     BMI Counseling: Body mass index is 28 72 kg/m²  The BMI is above normal  Nutrition recommendations include 3-5 servings of fruits/vegetables daily and increasing intake of lean protein  Other Visit Diagnoses     BMI 28 0-28 9,adult    -  Primary    Relevant Orders    Comprehensive metabolic panel    Lipid panel            Subjective:      Patient ID: Truitt Heimlich is a 39 y o  female  HPI  39year old F here for palpitations and flankpain  IT started about 1 5 wweeks ago  No injury or accident   It starts in the left flank and radiates to her chest and breast  She does get palpitations when pain is severe  No sob and no problems breathing  No abdomen pain  No rash  No nausea, vomiting, difficulty urinating    Left tendon injection done by dr Estephanie Tan  helped a lot  She is using nparoxen for 14 days for this  No problem with the wrist now  She is interested in gettign an injection on the left side  The following portions of the patient's history were reviewed and updated as appropriate: She  has a past medical history of Hypertension    She   Patient Active Problem List    Diagnosis Date Noted    Annual physical exam 04/01/2021    BMI 27 0-27 9,adult 04/01/2021    Screening for tuberculosis 04/01/2021    Tenosynovitis, de Quervain 04/01/2021    Encounter for screening mammogram for malignant neoplasm of breast 04/01/2021    Bilateral carpal tunnel syndrome     Cervical radiculopathy 07/07/2020    Joint pain in fingers of both hands 09/09/2019    Overweight 09/09/2019    Allergic rhinitis 11/08/2017    Dysfunction of both eustachian tubes 05/16/2017    Vertigo 05/16/2017    Abnormal EKG 08/16/2016    Chest pain 08/16/2016    Chronic bilateral low back pain without sciatica 08/01/2014    Rosacea 05/19/2014    Difficult or painful urination 04/14/2014    Thoracic back pain 04/14/2014    Migraine headache 04/14/2014     She  has a past surgical history that includes Tubal ligation  Her family history includes Breast cancer in her cousin, cousin, family, maternal aunt, and maternal aunt; Cervical cancer in her family; Diabetes type II in her father; Hyperlipidemia in her family; No Known Problems in her daughter, daughter, daughter, daughter, half-sister, half-sister, maternal aunt, maternal grandfather, mother, paternal aunt, paternal aunt, paternal grandfather, paternal grandmother, and sister; Stomach cancer in her family  She  reports that she has never smoked  She has never used smokeless tobacco  She reports that she does not drink alcohol or use drugs    Current Outpatient Medications   Medication Sig Dispense Refill    butalbital-acetaminophen-caffeine (FIORICET,ESGIC) -40 mg per tablet Take 1 tablet by mouth every 6 (six) hours as needed for headaches 15 tablet 0    naproxen (NAPROSYN) 500 mg tablet Take 1 tablet (500 mg total) by mouth 2 (two) times a day with meals for 14 days 28 tablet 0    phentermine 37 5 MG capsule Take 37 5 mg by mouth every morning      topiramate (TOPAMAX) 25 mg sprinkle capsule Take 25 mg by mouth 2 (two) times a day      topiramate (TOPAMAX) 25 mg tablet TAKE 1 TABLET ORAL TWO TIMES A DAY      cyclobenzaprine (FLEXERIL) 10 mg tablet Take 1 tablet (10 mg total) by mouth daily at bedtime (Patient not taking: Reported on 3/11/2021) 30 tablet 1    Diclofenac Sodium (VOLTAREN) 1 % Apply 2 g topically 4 (four) times a day (Patient not taking: Reported on 4/1/2021) 100 g 2    fluticasone (FLONASE) 50 mcg/act nasal spray 2 sprays into each nostril daily (Patient not taking: Reported on 3/11/2021) 3 Bottle 1    gabapentin (NEURONTIN) 300 mg capsule Take 1 capsule (300 mg total) by mouth daily at bedtime (Patient not taking: Reported on 3/11/2021) 30 capsule 2    tiZANidine (ZANAFLEX) 2 mg tablet Take 1 tablet (2 mg total) by mouth every 8 (eight) hours as needed for muscle spasms 30 tablet 0     No current facility-administered medications for this visit  Current Outpatient Medications on File Prior to Visit   Medication Sig    butalbital-acetaminophen-caffeine (FIORICET,ESGIC) -40 mg per tablet Take 1 tablet by mouth every 6 (six) hours as needed for headaches    naproxen (NAPROSYN) 500 mg tablet Take 1 tablet (500 mg total) by mouth 2 (two) times a day with meals for 14 days    phentermine 37 5 MG capsule Take 37 5 mg by mouth every morning    topiramate (TOPAMAX) 25 mg sprinkle capsule Take 25 mg by mouth 2 (two) times a day    topiramate (TOPAMAX) 25 mg tablet TAKE 1 TABLET ORAL TWO TIMES A DAY    cyclobenzaprine (FLEXERIL) 10 mg tablet Take 1 tablet (10 mg total) by mouth daily at bedtime (Patient not taking: Reported on 3/11/2021)    Diclofenac Sodium (VOLTAREN) 1 % Apply 2 g topically 4 (four) times a day (Patient not taking: Reported on 4/1/2021)    fluticasone (FLONASE) 50 mcg/act nasal spray 2 sprays into each nostril daily (Patient not taking: Reported on 3/11/2021)    gabapentin (NEURONTIN) 300 mg capsule Take 1 capsule (300 mg total) by mouth daily at bedtime (Patient not taking: Reported on 3/11/2021)     No current facility-administered medications on file prior to visit  She has No Known Allergies       Review of Systems   Constitutional: Negative for activity change, appetite change, chills, fatigue and unexpected weight change  HENT: Negative for ear pain, hearing loss and sore throat  Eyes: Negative for visual disturbance  Respiratory: Negative for cough and wheezing  Cardiovascular: Positive for palpitations  Negative for chest pain  Gastrointestinal: Negative for abdominal pain, constipation, diarrhea and vomiting  Genitourinary: Negative for difficulty urinating and dysuria  Musculoskeletal: Positive for back pain  Negative for arthralgias and myalgias  Skin: Negative for rash  Neurological: Negative for dizziness and headaches  Psychiatric/Behavioral: Negative for behavioral problems  Objective:      /60 (BP Location: Left arm, Patient Position: Sitting, Cuff Size: Standard)   Pulse 88   Temp 97 6 °F (36 4 °C) (Temporal)   Resp 16   Ht 5' 1" (1 549 m)   Wt 68 9 kg (152 lb)   LMP 10/28/2019 Comment: tubal ligation  SpO2 99%   Breastfeeding No   BMI 28 72 kg/m²          Physical Exam  Vitals signs and nursing note reviewed  Constitutional:       General: She is not in acute distress  Appearance: She is well-developed  HENT:      Head: Normocephalic and atraumatic  Right Ear: External ear normal       Left Ear: External ear normal    Eyes:      Conjunctiva/sclera: Conjunctivae normal    Neck:      Musculoskeletal: Normal range of motion and neck supple  Thyroid: No thyromegaly  Cardiovascular:      Rate and Rhythm: Normal rate and regular rhythm  Heart sounds: Normal heart sounds  No murmur  Pulmonary:      Effort: Pulmonary effort is normal  No respiratory distress  Breath sounds: Normal breath sounds  No wheezing  Musculoskeletal:         General: Tenderness (left mid axillary line rib 4, 4th rib posteriorly and right breast ) present  Lymphadenopathy:      Cervical: No cervical adenopathy  Neurological:      Mental Status: She is alert and oriented to person, place, and time     Psychiatric:         Mood and Affect: Mood normal          Behavior: Behavior normal        EKG NSR

## 2021-04-28 NOTE — ASSESSMENT & PLAN NOTE
BMI Counseling: Body mass index is 28 72 kg/m²  The BMI is above normal  Nutrition recommendations include 3-5 servings of fruits/vegetables daily and increasing intake of lean protein

## 2021-05-18 ENCOUNTER — OFFICE VISIT (OUTPATIENT)
Dept: OBGYN CLINIC | Facility: MEDICAL CENTER | Age: 46
End: 2021-05-18
Payer: COMMERCIAL

## 2021-05-18 DIAGNOSIS — S69.81XA INJURY OF TRIANGULAR FIBROCARTILAGE COMPLEX (TFCC) OF RIGHT WRIST, INITIAL ENCOUNTER: Primary | ICD-10-CM

## 2021-05-18 PROCEDURE — 1036F TOBACCO NON-USER: CPT | Performed by: ORTHOPAEDIC SURGERY

## 2021-05-18 PROCEDURE — 99214 OFFICE O/P EST MOD 30 MIN: CPT | Performed by: ORTHOPAEDIC SURGERY

## 2021-05-18 PROCEDURE — 20605 DRAIN/INJ JOINT/BURSA W/O US: CPT | Performed by: ORTHOPAEDIC SURGERY

## 2021-05-18 RX ADMIN — LIDOCAINE HYDROCHLORIDE 1 ML: 10 INJECTION, SOLUTION INFILTRATION; PERINEURAL at 10:55

## 2021-05-18 RX ADMIN — BETAMETHASONE SODIUM PHOSPHATE AND BETAMETHASONE ACETATE 6 MG: 3; 3 INJECTION, SUSPENSION INTRA-ARTICULAR; INTRALESIONAL; INTRAMUSCULAR; SOFT TISSUE at 10:55

## 2021-05-18 NOTE — PROGRESS NOTES
Chief Complaint     Bilateral wrist pain      History of Present Illness     Paradise Quintanilla is a 39 y o  female who presents today for follow up of bilateral wrist pain  At her last visit, patient had signs consistent with DeQuervain's tenosynovitis on the left  She was given a steroid injection and asked to wear her thumb spica brace for 2 weeks which she did  She states this side is feeling much better  Regarding the right side, her signs on exam pointed to possible TFCC injury  At her last visit, patient was given a brace, which she has worn both during the day and at night  She states she continues to have ulnar-sided wrist pain here  No obvious clicking  Patient is currently working as a care worker         Past Medical History:   Diagnosis Date    Hypertension     Last assessed 4/29/2013        Past Surgical History:   Procedure Laterality Date    TUBAL LIGATION         No Known Allergies    Current Outpatient Medications on File Prior to Visit   Medication Sig Dispense Refill    butalbital-acetaminophen-caffeine (FIORICET,ESGIC) -40 mg per tablet Take 1 tablet by mouth every 6 (six) hours as needed for headaches 15 tablet 0    cyclobenzaprine (FLEXERIL) 10 mg tablet Take 1 tablet (10 mg total) by mouth daily at bedtime (Patient not taking: Reported on 3/11/2021) 30 tablet 1    Diclofenac Sodium (VOLTAREN) 1 % Apply 2 g topically 4 (four) times a day (Patient not taking: Reported on 4/1/2021) 100 g 2    fluticasone (FLONASE) 50 mcg/act nasal spray 2 sprays into each nostril daily (Patient not taking: Reported on 3/11/2021) 3 Bottle 1    gabapentin (NEURONTIN) 300 mg capsule Take 1 capsule (300 mg total) by mouth daily at bedtime (Patient not taking: Reported on 3/11/2021) 30 capsule 2    naproxen (NAPROSYN) 500 mg tablet Take 1 tablet (500 mg total) by mouth 2 (two) times a day with meals for 14 days 28 tablet 0    phentermine 37 5 MG capsule Take 37 5 mg by mouth every morning      tiZANidine (ZANAFLEX) 2 mg tablet Take 1 tablet (2 mg total) by mouth every 8 (eight) hours as needed for muscle spasms 30 tablet 0    topiramate (TOPAMAX) 25 mg sprinkle capsule Take 25 mg by mouth 2 (two) times a day      topiramate (TOPAMAX) 25 mg tablet TAKE 1 TABLET ORAL TWO TIMES A DAY       No current facility-administered medications on file prior to visit  Social History     Tobacco Use    Smoking status: Never Smoker    Smokeless tobacco: Never Used   Substance Use Topics    Alcohol use: No    Drug use: No       Family History   Problem Relation Age of Onset    Diabetes type II Father     Breast cancer Family     Cervical cancer Family     Stomach cancer Family     Hyperlipidemia Family     No Known Problems Mother     No Known Problems Sister     No Known Problems Daughter     No Known Problems Maternal Grandfather     No Known Problems Paternal Grandmother     No Known Problems Paternal Grandfather     No Known Problems Half-Sister     No Known Problems Half-Sister     No Known Problems Daughter     No Known Problems Daughter     No Known Problems Daughter     Breast cancer Maternal Aunt     Breast cancer Maternal Aunt     No Known Problems Paternal Aunt     No Known Problems Paternal Aunt     Breast cancer Cousin     No Known Problems Maternal Aunt     Breast cancer Cousin        Review of Systems     As stated in the HPI  All other systems were reviewed and are negative  Physical Exam     LMP 10/28/2019 Comment: tubal ligation    GENERAL: This is a well-developed, well-nourished, age-appropriate patient in no acute distress  The patient is alert and oriented x3  Pleasant and cooperative  Eyes: Anicteric sclerae  Extraocular movements appear intact  HENT: Nares are patent with no drainage  Lungs: There is equal chest rise on inspection  Breathing is non-labored with no audible wheezing  Cardiovascular: No cyanosis  No upper extremity lymphadema    Skin: Skin is warm to touch  No obvious skin lesions or rashes other than described below  Neurologic: No ataxia  Psychiatric: Mood and affect are appropriate  Right Upper Extremity  No erythema, ecchymosis  Patient denies tenderness to palpation of DRUJ, scapholunate area  Tenderness to palpation ulnar fovea > ECU  Minimal discomfort with Synergy Test    Negative impaction signs  Full wrist flexion/extension and pronation/supination  5/5 Motor to the APB, FDI, FDP2, FDP5, EDC  Sensation intact to light touch in the median, radial, and ulnar nerve distribution  Radial pulse 2+     Left Upper Extremity:  Patient without edema, erythema, ecchymosis  Patient denies tenderness to palpation 1st dorsal compartment  Negative Finkelstein's  5/5 Motor to the APB, FDI, FDP2, FDP5, EDC  Sensation intact to light touch in the median, radial, and ulnar nerve distribution  Radial pulse 2+  Data Review     Labs:  None    Electrodiagnostic Testing:  No new to review    Imaging:  No new to review    Assessment and Plan      Diagnoses and all orders for this visit:    Injury of triangular fibrocartilage complex (TFCC) of right wrist, initial encounter       39year old female with right TFCC injury  Since patient continues to have discomfort despite bracing, I did inform patient that she would be a candidate for a steroid injection today for both diagnostic and therapeutic purposes  Risks of the injection including pain, infection, tendon rupture, progression of arthritis, fat atrophy, depigmentation, and worsening of symptoms were all discussed with the patient  There was good understanding by the patient and they wish to proceed  Patient chooses to proceed with this and tolerated the procedure well  She was instructed to wear brace for another month in addition to this  As for the left DeQuervain's, this appears to have resolved which is great news         Follow Up: 4 weeks prn    To Do Next Visit: Michelle Tate symptoms    PROCEDURES PERFORMED:  Medium joint arthrocentesis: R ulnocarpal  Universal Protocol:  Consent given by: patient  Time out: Immediately prior to procedure a "time out" was called to verify the correct patient, procedure, equipment, support staff and site/side marked as required    Patient identity confirmed: verbally with patient    Supporting Documentation  Indications: pain and joint swelling   Procedure Details  Location: wrist - R ulnocarpal  Needle size: 25 G  Ultrasound guidance: no  Approach: dorsal  Medications administered: 1 mL lidocaine 1 %; 6 mg betamethasone acetate-betamethasone sodium phosphate 6 (3-3) mg/mL    Patient tolerance: patient tolerated the procedure well with no immediate complications  Dressing:  Sterile dressing applied

## 2021-05-19 RX ORDER — LIDOCAINE HYDROCHLORIDE 10 MG/ML
1 INJECTION, SOLUTION INFILTRATION; PERINEURAL
Status: COMPLETED | OUTPATIENT
Start: 2021-05-18 | End: 2021-05-18

## 2021-05-19 RX ORDER — BETAMETHASONE SODIUM PHOSPHATE AND BETAMETHASONE ACETATE 3; 3 MG/ML; MG/ML
6 INJECTION, SUSPENSION INTRA-ARTICULAR; INTRALESIONAL; INTRAMUSCULAR; SOFT TISSUE
Status: COMPLETED | OUTPATIENT
Start: 2021-05-18 | End: 2021-05-18

## 2021-06-15 ENCOUNTER — OFFICE VISIT (OUTPATIENT)
Dept: OBGYN CLINIC | Facility: MEDICAL CENTER | Age: 46
End: 2021-06-15
Payer: COMMERCIAL

## 2021-06-15 VITALS
SYSTOLIC BLOOD PRESSURE: 142 MMHG | BODY MASS INDEX: 28.7 KG/M2 | HEIGHT: 61 IN | HEART RATE: 70 BPM | WEIGHT: 152 LBS | DIASTOLIC BLOOD PRESSURE: 89 MMHG

## 2021-06-15 DIAGNOSIS — G56.03 CARPAL TUNNEL SYNDROME, BILATERAL: Primary | ICD-10-CM

## 2021-06-15 PROCEDURE — 99214 OFFICE O/P EST MOD 30 MIN: CPT | Performed by: ORTHOPAEDIC SURGERY

## 2021-06-15 PROCEDURE — 1036F TOBACCO NON-USER: CPT | Performed by: ORTHOPAEDIC SURGERY

## 2021-06-15 PROCEDURE — 3008F BODY MASS INDEX DOCD: CPT | Performed by: ORTHOPAEDIC SURGERY

## 2021-06-15 NOTE — PROGRESS NOTES
Chief Complaint     Bilateral hand numbness      History of Present Illness     Sandra Roper is a 42 y o  female who presents to the office today for follow up of left DeQuervain's and right TFCC injury  Patient received steroid injections to both of these areas and patient states that these helped her pain greatly and no longer has this  Instead she describes numbness and tingling in her hands  States she was told previously she has carpal tunnel in her hands  She states she has been wearing braces on her wrists at night for approximately 1 month and feels like this helps some, but still will wake up with numbness  She has not worn anything on her elbows  Patient denies symptoms in the small finger  Patient is currently working as a care worker         Past Medical History:   Diagnosis Date    Hypertension     Last assessed 4/29/2013        Past Surgical History:   Procedure Laterality Date    TUBAL LIGATION         No Known Allergies    Current Outpatient Medications on File Prior to Visit   Medication Sig Dispense Refill    butalbital-acetaminophen-caffeine (FIORICET,ESGIC) -40 mg per tablet Take 1 tablet by mouth every 6 (six) hours as needed for headaches 15 tablet 0    phentermine 37 5 MG capsule Take 37 5 mg by mouth every morning      tiZANidine (ZANAFLEX) 2 mg tablet Take 1 tablet (2 mg total) by mouth every 8 (eight) hours as needed for muscle spasms 30 tablet 0    topiramate (TOPAMAX) 25 mg sprinkle capsule Take 25 mg by mouth 2 (two) times a day      topiramate (TOPAMAX) 25 mg tablet TAKE 1 TABLET ORAL TWO TIMES A DAY      cyclobenzaprine (FLEXERIL) 10 mg tablet Take 1 tablet (10 mg total) by mouth daily at bedtime (Patient not taking: Reported on 3/11/2021) 30 tablet 1    Diclofenac Sodium (VOLTAREN) 1 % Apply 2 g topically 4 (four) times a day (Patient not taking: Reported on 4/1/2021) 100 g 2    fluticasone (FLONASE) 50 mcg/act nasal spray 2 sprays into each nostril daily (Patient not taking: Reported on 3/11/2021) 3 Bottle 1    gabapentin (NEURONTIN) 300 mg capsule Take 1 capsule (300 mg total) by mouth daily at bedtime (Patient not taking: Reported on 3/11/2021) 30 capsule 2    naproxen (NAPROSYN) 500 mg tablet Take 1 tablet (500 mg total) by mouth 2 (two) times a day with meals for 14 days 28 tablet 0     No current facility-administered medications on file prior to visit  Social History     Tobacco Use    Smoking status: Never Smoker    Smokeless tobacco: Never Used   Vaping Use    Vaping Use: Never used   Substance Use Topics    Alcohol use: No    Drug use: No       Family History   Problem Relation Age of Onset    Diabetes type II Father     Breast cancer Family     Cervical cancer Family     Stomach cancer Family     Hyperlipidemia Family     No Known Problems Mother     No Known Problems Sister     No Known Problems Daughter     No Known Problems Maternal Grandfather     No Known Problems Paternal Grandmother     No Known Problems Paternal Grandfather     No Known Problems Half-Sister     No Known Problems Half-Sister     No Known Problems Daughter     No Known Problems Daughter     No Known Problems Daughter     Breast cancer Maternal Aunt     Breast cancer Maternal Aunt     No Known Problems Paternal Aunt     No Known Problems Paternal Aunt     Breast cancer Cousin     No Known Problems Maternal Aunt     Breast cancer Cousin        Review of Systems     As stated in the HPI  All other systems were reviewed and are negative  Physical Exam     /89   Pulse 70   Ht 5' 1" (1 549 m)   Wt 68 9 kg (152 lb)   LMP 10/28/2019 Comment: tubal ligation  BMI 28 72 kg/m²     GENERAL: This is a well-developed, well-nourished, age-appropriate patient in no acute distress  The patient is alert and oriented x3  Pleasant and cooperative  Eyes: Anicteric sclerae  Extraocular movements appear intact  HENT: Nares are patent with no drainage    Lungs: There is equal chest rise on inspection  Breathing is non-labored with no audible wheezing  Cardiovascular: No cyanosis  No upper extremity lymphadema  Skin: Skin is warm to touch  No obvious skin lesions or rashes other than described below  Neurologic: No ataxia  Psychiatric: Mood and affect are appropriate  Right Upper Extremity:  No erythema, edema or ecchymosis  Skin is intact  Full elbow flexion/extension without ulnar nerve subluxation  DPC 0  Patient denies tenderness to palpation of TFCC, ulnar fovea and ECU  Full wrist flexion/extension without pain  No thenar atrophy  No hypothenar atrophy  No 1st dorsal interosseous atrophy  Positive Tinel's to the carpal tunnel  Positive Durkan's compression test   Positive Tinel's to cubital tunnel  Positive Elbow flexion compression test   5/5 Motor to APB  5/5 Motor to the FDI, FDP2, FDP5, EDC  Sensation intact to light touch in the median, radial and ulnar nerve distributions  Radial pulse 2+  Left Upper Extremity:  No erythema, edema or ecchymosis  Skin is intact  Full elbow flexion/extension without ulnar nerve subluxation  DPC 0  Patient has a cyst along her 1st dorsal compartment, but no tenderness here  Negative Finkelstein's test   No thenar atrophy  No hypothenar atrophy  No 1st dorsal interosseous atrophy  Positive Tinel's to the carpal tunnel  Positive Durkan's compression test   Mildly positive Tinel's to cubital tunnel  Negative Elbow flexion compression test   5/5 Motor to APB  5/5 Motor to the FDI, FDP2, FDP5, EDC  Sensation intact to light touch in the median, radial and ulnar nerve distributions  Radial pulse 2+  Cervical Spine:  Patient with well maintained cervical spine motion  Patient describes some slight radiating pain down the upper arm with Spurling's to the right, no symptoms to the left  No radicular symptoms with neck flexion/extenstion         Data Review     Labs:  None    Electrodiagnostic Testing:  Study from 07/22/2020 was personally reviewed by myself and demonstrate increased peak latency, decreased velocity and decreased amplitude consistent with bilateral carpal tunnel  Imaging:  None    Assessment and Plan      Diagnoses and all orders for this visit:    Carpal tunnel syndrome, bilateral       42year old female with improved left DeQuervain's and right TFCC injury improved with use of steroid injections  Patient also with bilateral carpal tunnel syndrome  I explained she may also have some cubital tunnel syndrome, but this is not seen on the nerve study and her symptoms are primarily median nerve distribution  I discussed that since the patient has continued to have symptoms despite nocturnal bracing, she would be a candidate for carpal tunnel release  I explained that if she continues to have symptoms, we may have to address the cubital tunnel in the future, but her symptoms may improve from just the carpal tunnel release  After discussion of the surgery and postoperative protocol, patient agrees to proceed with carpal tunnel release only  Risks, benefits and alternatives of the surgery were discussed with the patient today  Details of postoperative recovery and protocol were also discussed  Patient states understanding and agrees to proceed with carpal tunnel release surgery one-sided the time under IV sedation  We will have her come to a postoperative visit for suture removal 10 days after the 1st surgery  Follow Up:  After surgery    To Do Next Visit: Sutures out    PROCEDURES PERFORMED:  Procedures  No Procedures performed today

## 2021-06-28 ENCOUNTER — TELEPHONE (OUTPATIENT)
Dept: FAMILY MEDICINE CLINIC | Facility: CLINIC | Age: 46
End: 2021-06-28

## 2021-06-30 ENCOUNTER — APPOINTMENT (OUTPATIENT)
Dept: LAB | Facility: CLINIC | Age: 46
End: 2021-06-30
Payer: COMMERCIAL

## 2021-06-30 DIAGNOSIS — R07.9 CHEST PAIN, UNSPECIFIED TYPE: ICD-10-CM

## 2021-06-30 LAB
ALBUMIN SERPL BCP-MCNC: 4 G/DL (ref 3.5–5)
ALP SERPL-CCNC: 135 U/L (ref 46–116)
ALT SERPL W P-5'-P-CCNC: 27 U/L (ref 12–78)
ANION GAP SERPL CALCULATED.3IONS-SCNC: 1 MMOL/L (ref 4–13)
AST SERPL W P-5'-P-CCNC: 13 U/L (ref 5–45)
BASOPHILS # BLD AUTO: 0.03 THOUSANDS/ΜL (ref 0–0.1)
BASOPHILS NFR BLD AUTO: 0 % (ref 0–1)
BILIRUB SERPL-MCNC: 0.5 MG/DL (ref 0.2–1)
BUN SERPL-MCNC: 13 MG/DL (ref 5–25)
CALCIUM SERPL-MCNC: 9.3 MG/DL (ref 8.3–10.1)
CHLORIDE SERPL-SCNC: 107 MMOL/L (ref 100–108)
CHOLEST SERPL-MCNC: 163 MG/DL (ref 50–200)
CO2 SERPL-SCNC: 31 MMOL/L (ref 21–32)
CREAT SERPL-MCNC: 0.58 MG/DL (ref 0.6–1.3)
EOSINOPHIL # BLD AUTO: 0.15 THOUSAND/ΜL (ref 0–0.61)
EOSINOPHIL NFR BLD AUTO: 2 % (ref 0–6)
ERYTHROCYTE [DISTWIDTH] IN BLOOD BY AUTOMATED COUNT: 12 % (ref 11.6–15.1)
GFR SERPL CREATININE-BSD FRML MDRD: 112 ML/MIN/1.73SQ M
GLUCOSE P FAST SERPL-MCNC: 85 MG/DL (ref 65–99)
HCT VFR BLD AUTO: 44.2 % (ref 34.8–46.1)
HDLC SERPL-MCNC: 53 MG/DL
HGB BLD-MCNC: 14.2 G/DL (ref 11.5–15.4)
IMM GRANULOCYTES # BLD AUTO: 0.02 THOUSAND/UL (ref 0–0.2)
IMM GRANULOCYTES NFR BLD AUTO: 0 % (ref 0–2)
LDLC SERPL CALC-MCNC: 98 MG/DL (ref 0–100)
LYMPHOCYTES # BLD AUTO: 2.73 THOUSANDS/ΜL (ref 0.6–4.47)
LYMPHOCYTES NFR BLD AUTO: 37 % (ref 14–44)
MCH RBC QN AUTO: 28.9 PG (ref 26.8–34.3)
MCHC RBC AUTO-ENTMCNC: 32.1 G/DL (ref 31.4–37.4)
MCV RBC AUTO: 90 FL (ref 82–98)
MONOCYTES # BLD AUTO: 0.63 THOUSAND/ΜL (ref 0.17–1.22)
MONOCYTES NFR BLD AUTO: 9 % (ref 4–12)
NEUTROPHILS # BLD AUTO: 3.86 THOUSANDS/ΜL (ref 1.85–7.62)
NEUTS SEG NFR BLD AUTO: 52 % (ref 43–75)
NONHDLC SERPL-MCNC: 110 MG/DL
NRBC BLD AUTO-RTO: 0 /100 WBCS
PLATELET # BLD AUTO: 281 THOUSANDS/UL (ref 149–390)
PMV BLD AUTO: 10.6 FL (ref 8.9–12.7)
POTASSIUM SERPL-SCNC: 4.2 MMOL/L (ref 3.5–5.3)
PROT SERPL-MCNC: 7.7 G/DL (ref 6.4–8.2)
RBC # BLD AUTO: 4.91 MILLION/UL (ref 3.81–5.12)
SODIUM SERPL-SCNC: 139 MMOL/L (ref 136–145)
TRIGL SERPL-MCNC: 62 MG/DL
WBC # BLD AUTO: 7.42 THOUSAND/UL (ref 4.31–10.16)

## 2021-06-30 PROCEDURE — 36415 COLL VENOUS BLD VENIPUNCTURE: CPT

## 2021-06-30 PROCEDURE — 80061 LIPID PANEL: CPT

## 2021-06-30 PROCEDURE — 85025 COMPLETE CBC W/AUTO DIFF WBC: CPT

## 2021-06-30 PROCEDURE — 80053 COMPREHEN METABOLIC PANEL: CPT

## 2021-07-02 ENCOUNTER — OFFICE VISIT (OUTPATIENT)
Dept: FAMILY MEDICINE CLINIC | Facility: CLINIC | Age: 46
End: 2021-07-02

## 2021-07-02 VITALS
HEIGHT: 61 IN | TEMPERATURE: 96.3 F | OXYGEN SATURATION: 98 % | RESPIRATION RATE: 20 BRPM | WEIGHT: 156.8 LBS | BODY MASS INDEX: 29.6 KG/M2 | DIASTOLIC BLOOD PRESSURE: 94 MMHG | SYSTOLIC BLOOD PRESSURE: 152 MMHG | HEART RATE: 84 BPM

## 2021-07-02 DIAGNOSIS — Z71.85 VACCINE COUNSELING: ICD-10-CM

## 2021-07-02 DIAGNOSIS — G43.709 CHRONIC MIGRAINE WITHOUT AURA WITHOUT STATUS MIGRAINOSUS, NOT INTRACTABLE: Primary | ICD-10-CM

## 2021-07-02 PROCEDURE — 3008F BODY MASS INDEX DOCD: CPT | Performed by: ORTHOPAEDIC SURGERY

## 2021-07-02 PROCEDURE — 99213 OFFICE O/P EST LOW 20 MIN: CPT | Performed by: PHYSICIAN ASSISTANT

## 2021-07-02 PROCEDURE — T1015 CLINIC SERVICE: HCPCS | Performed by: PHYSICIAN ASSISTANT

## 2021-07-02 NOTE — PROGRESS NOTES
Assessment/Plan:    Migraine headache  Doing well no treatment needed           Problem List Items Addressed This Visit        Cardiovascular and Mediastinum    Migraine headache - Primary     Doing well no treatment needed             Other Visit Diagnoses     Vaccine counseling            recommend getting covid vaccine still    Subjective:      Patient ID: Marilyn Serra is a 42 y o  female  HPI  42year old F here for follow up of migranes  She is doing much better  She was rx f;pnase, fioricet but is not taking anyhing anymore and is doing well    She is having bilateral carpal tunnel surgery this month  She is worried because ater last surgery she had felt very out of it and confused   It did get better after a week  She thinks it was reaction to anesthesia  She is reluctant to get covid vaccine to do itch /rash mom has had after it  The following portions of the patient's history were reviewed and updated as appropriate: She  has a past medical history of Hypertension  She   Patient Active Problem List    Diagnosis Date Noted    Annual physical exam 04/01/2021    BMI 27 0-27 9,adult 04/01/2021    Screening for tuberculosis 04/01/2021    Tenosynovitis, de Quervain 04/01/2021    Encounter for screening mammogram for malignant neoplasm of breast 04/01/2021    Bilateral carpal tunnel syndrome     Cervical radiculopathy 07/07/2020    Joint pain in fingers of both hands 09/09/2019    Overweight 09/09/2019    Allergic rhinitis 11/08/2017    Dysfunction of both eustachian tubes 05/16/2017    Vertigo 05/16/2017    Abnormal EKG 08/16/2016    Chest pain 08/16/2016    Chronic bilateral low back pain without sciatica 08/01/2014    Rosacea 05/19/2014    Difficult or painful urination 04/14/2014    Thoracic back pain 04/14/2014    Migraine headache 04/14/2014     She  has a past surgical history that includes Tubal ligation    Her family history includes Breast cancer in her cousin, cousin, family, maternal aunt, and maternal aunt; Cervical cancer in her family; Diabetes type II in her father; Hyperlipidemia in her family; No Known Problems in her daughter, daughter, daughter, daughter, half-sister, half-sister, maternal aunt, maternal grandfather, mother, paternal aunt, paternal aunt, paternal grandfather, paternal grandmother, and sister; Stomach cancer in her family  She  reports that she has never smoked  She has never used smokeless tobacco  She reports that she does not drink alcohol and does not use drugs  Current Outpatient Medications   Medication Sig Dispense Refill    butalbital-acetaminophen-caffeine (FIORICET,ESGIC) -40 mg per tablet Take 1 tablet by mouth every 6 (six) hours as needed for headaches 15 tablet 0    phentermine 37 5 MG capsule Take 37 5 mg by mouth every morning      tiZANidine (ZANAFLEX) 2 mg tablet Take 1 tablet (2 mg total) by mouth every 8 (eight) hours as needed for muscle spasms 30 tablet 0    topiramate (TOPAMAX) 25 mg sprinkle capsule Take 25 mg by mouth 2 (two) times a day      topiramate (TOPAMAX) 25 mg tablet TAKE 1 TABLET ORAL TWO TIMES A DAY      cyclobenzaprine (FLEXERIL) 10 mg tablet Take 1 tablet (10 mg total) by mouth daily at bedtime (Patient not taking: Reported on 3/11/2021) 30 tablet 1    Diclofenac Sodium (VOLTAREN) 1 % Apply 2 g topically 4 (four) times a day (Patient not taking: Reported on 4/1/2021) 100 g 2    fluticasone (FLONASE) 50 mcg/act nasal spray 2 sprays into each nostril daily (Patient not taking: Reported on 3/11/2021) 3 Bottle 1    gabapentin (NEURONTIN) 300 mg capsule Take 1 capsule (300 mg total) by mouth daily at bedtime (Patient not taking: Reported on 3/11/2021) 30 capsule 2    naproxen (NAPROSYN) 500 mg tablet Take 1 tablet (500 mg total) by mouth 2 (two) times a day with meals for 14 days 28 tablet 0     No current facility-administered medications for this visit       Current Outpatient Medications on File Prior to Visit   Medication Sig    butalbital-acetaminophen-caffeine (FIORICET,ESGIC) -40 mg per tablet Take 1 tablet by mouth every 6 (six) hours as needed for headaches    phentermine 37 5 MG capsule Take 37 5 mg by mouth every morning    tiZANidine (ZANAFLEX) 2 mg tablet Take 1 tablet (2 mg total) by mouth every 8 (eight) hours as needed for muscle spasms    topiramate (TOPAMAX) 25 mg sprinkle capsule Take 25 mg by mouth 2 (two) times a day    topiramate (TOPAMAX) 25 mg tablet TAKE 1 TABLET ORAL TWO TIMES A DAY    cyclobenzaprine (FLEXERIL) 10 mg tablet Take 1 tablet (10 mg total) by mouth daily at bedtime (Patient not taking: Reported on 3/11/2021)    Diclofenac Sodium (VOLTAREN) 1 % Apply 2 g topically 4 (four) times a day (Patient not taking: Reported on 4/1/2021)    fluticasone (FLONASE) 50 mcg/act nasal spray 2 sprays into each nostril daily (Patient not taking: Reported on 3/11/2021)    gabapentin (NEURONTIN) 300 mg capsule Take 1 capsule (300 mg total) by mouth daily at bedtime (Patient not taking: Reported on 3/11/2021)    naproxen (NAPROSYN) 500 mg tablet Take 1 tablet (500 mg total) by mouth 2 (two) times a day with meals for 14 days     No current facility-administered medications on file prior to visit  She has No Known Allergies       Review of Systems   Constitutional: Negative for activity change, appetite change, chills, fatigue and unexpected weight change  HENT: Negative for dental problem, ear pain, hearing loss and sore throat  Eyes: Negative for visual disturbance  Respiratory: Negative for cough and wheezing  Cardiovascular: Negative for chest pain  Gastrointestinal: Negative for abdominal pain, constipation, diarrhea and vomiting  Genitourinary: Negative for difficulty urinating and dysuria  Musculoskeletal: Negative for arthralgias, back pain and myalgias  Skin: Negative for rash  Neurological: Positive for weakness and numbness   Negative for dizziness and headaches  Psychiatric/Behavioral: Negative for behavioral problems  Objective:      /94 (BP Location: Left arm, Patient Position: Sitting, Cuff Size: Standard)   Pulse 84   Temp (!) 96 3 °F (35 7 °C) (Temporal)   Resp 20   Ht 5' 1" (1 549 m)   Wt 71 1 kg (156 lb 12 8 oz)   LMP 10/28/2019 Comment: tubal ligation  SpO2 98%   BMI 29 63 kg/m²          Physical Exam  Vitals and nursing note reviewed  Constitutional:       General: She is not in acute distress  Appearance: She is well-developed  HENT:      Head: Normocephalic and atraumatic  Right Ear: External ear normal       Left Ear: External ear normal    Eyes:      Conjunctiva/sclera: Conjunctivae normal    Neck:      Thyroid: No thyromegaly  Cardiovascular:      Rate and Rhythm: Normal rate and regular rhythm  Heart sounds: Normal heart sounds  No murmur heard  Pulmonary:      Effort: Pulmonary effort is normal  No respiratory distress  Breath sounds: Normal breath sounds  No wheezing  Musculoskeletal:      Cervical back: Normal range of motion and neck supple  Lymphadenopathy:      Cervical: No cervical adenopathy  Neurological:      Mental Status: She is alert and oriented to person, place, and time     Psychiatric:         Behavior: Behavior normal

## 2021-07-09 RX ORDER — IBUPROFEN 200 MG
TABLET ORAL EVERY 6 HOURS PRN
COMMUNITY

## 2021-07-09 NOTE — PRE-PROCEDURE INSTRUCTIONS
Pre-Surgery Instructions:   Medication Instructions    butalbital-acetaminophen-caffeine (FIORICET,ESGIC) -40 mg per tablet Instructed patient per Anesthesia Guidelines   ibuprofen (Advil) 200 mg tablet Instructed patient per Anesthesia Guidelines  Pt instructed on use of chlorhexidine soap per hospital protocol    Patient instructed to stop all ASA, NSAIDS(at least 3 days), vitamins and herbal supplements one week prior to surgery or per Dr Brenda Carpenter

## 2021-07-14 ENCOUNTER — ANESTHESIA EVENT (OUTPATIENT)
Dept: PERIOP | Facility: HOSPITAL | Age: 46
End: 2021-07-14
Payer: COMMERCIAL

## 2021-07-16 ENCOUNTER — HOSPITAL ENCOUNTER (OUTPATIENT)
Facility: HOSPITAL | Age: 46
Setting detail: OUTPATIENT SURGERY
Discharge: HOME | End: 2021-07-16
Attending: ORTHOPAEDIC SURGERY | Admitting: ORTHOPAEDIC SURGERY
Payer: COMMERCIAL

## 2021-07-16 ENCOUNTER — ANESTHESIA (OUTPATIENT)
Dept: PERIOP | Facility: HOSPITAL | Age: 46
End: 2021-07-16
Payer: COMMERCIAL

## 2021-07-16 VITALS
HEIGHT: 61 IN | HEART RATE: 67 BPM | OXYGEN SATURATION: 97 % | WEIGHT: 153 LBS | SYSTOLIC BLOOD PRESSURE: 107 MMHG | RESPIRATION RATE: 14 BRPM | TEMPERATURE: 97.8 F | DIASTOLIC BLOOD PRESSURE: 65 MMHG | BODY MASS INDEX: 28.89 KG/M2

## 2021-07-16 DIAGNOSIS — G56.03 BILATERAL CARPAL TUNNEL SYNDROME: Primary | ICD-10-CM

## 2021-07-16 LAB
EXT PREGNANCY TEST URINE: NEGATIVE
EXT. CONTROL: NORMAL

## 2021-07-16 PROCEDURE — 81025 URINE PREGNANCY TEST: CPT | Performed by: ANESTHESIOLOGY

## 2021-07-16 PROCEDURE — NC001 PR NO CHARGE: Performed by: ORTHOPAEDIC SURGERY

## 2021-07-16 PROCEDURE — 64721 CARPAL TUNNEL SURGERY: CPT | Performed by: ORTHOPAEDIC SURGERY

## 2021-07-16 PROCEDURE — 64721 CARPAL TUNNEL SURGERY: CPT | Performed by: PHYSICIAN ASSISTANT

## 2021-07-16 RX ORDER — MEPERIDINE HYDROCHLORIDE 25 MG/ML
12.5 INJECTION INTRAMUSCULAR; INTRAVENOUS; SUBCUTANEOUS
Status: DISCONTINUED | OUTPATIENT
Start: 2021-07-16 | End: 2021-07-16 | Stop reason: HOSPADM

## 2021-07-16 RX ORDER — OXYCODONE HYDROCHLORIDE 5 MG/1
5 TABLET ORAL EVERY 4 HOURS PRN
Status: DISCONTINUED | OUTPATIENT
Start: 2021-07-16 | End: 2021-07-16 | Stop reason: HOSPADM

## 2021-07-16 RX ORDER — HYDROMORPHONE HCL/PF 1 MG/ML
0.5 SYRINGE (ML) INJECTION
Status: DISCONTINUED | OUTPATIENT
Start: 2021-07-16 | End: 2021-07-16 | Stop reason: HOSPADM

## 2021-07-16 RX ORDER — KETOROLAC TROMETHAMINE 30 MG/ML
INJECTION, SOLUTION INTRAMUSCULAR; INTRAVENOUS AS NEEDED
Status: DISCONTINUED | OUTPATIENT
Start: 2021-07-16 | End: 2021-07-16

## 2021-07-16 RX ORDER — OXYCODONE HYDROCHLORIDE 5 MG/1
5 TABLET ORAL EVERY 6 HOURS PRN
Qty: 5 TABLET | Refills: 0 | Status: SHIPPED | OUTPATIENT
Start: 2021-07-16 | End: 2021-07-26

## 2021-07-16 RX ORDER — SODIUM CHLORIDE, SODIUM LACTATE, POTASSIUM CHLORIDE, CALCIUM CHLORIDE 600; 310; 30; 20 MG/100ML; MG/100ML; MG/100ML; MG/100ML
125 INJECTION, SOLUTION INTRAVENOUS CONTINUOUS
Status: DISCONTINUED | OUTPATIENT
Start: 2021-07-16 | End: 2021-07-16 | Stop reason: HOSPADM

## 2021-07-16 RX ORDER — ONDANSETRON 2 MG/ML
INJECTION INTRAMUSCULAR; INTRAVENOUS AS NEEDED
Status: DISCONTINUED | OUTPATIENT
Start: 2021-07-16 | End: 2021-07-16

## 2021-07-16 RX ORDER — ONDANSETRON 2 MG/ML
4 INJECTION INTRAMUSCULAR; INTRAVENOUS ONCE AS NEEDED
Status: DISCONTINUED | OUTPATIENT
Start: 2021-07-16 | End: 2021-07-16 | Stop reason: HOSPADM

## 2021-07-16 RX ORDER — PROPOFOL 10 MG/ML
INJECTION, EMULSION INTRAVENOUS CONTINUOUS PRN
Status: DISCONTINUED | OUTPATIENT
Start: 2021-07-16 | End: 2021-07-16

## 2021-07-16 RX ORDER — FENTANYL CITRATE 50 UG/ML
INJECTION, SOLUTION INTRAMUSCULAR; INTRAVENOUS AS NEEDED
Status: DISCONTINUED | OUTPATIENT
Start: 2021-07-16 | End: 2021-07-16

## 2021-07-16 RX ORDER — FENTANYL CITRATE/PF 50 MCG/ML
25 SYRINGE (ML) INJECTION
Status: DISCONTINUED | OUTPATIENT
Start: 2021-07-16 | End: 2021-07-16 | Stop reason: HOSPADM

## 2021-07-16 RX ORDER — PROPOFOL 10 MG/ML
INJECTION, EMULSION INTRAVENOUS AS NEEDED
Status: DISCONTINUED | OUTPATIENT
Start: 2021-07-16 | End: 2021-07-16

## 2021-07-16 RX ORDER — MIDAZOLAM HYDROCHLORIDE 2 MG/2ML
INJECTION, SOLUTION INTRAMUSCULAR; INTRAVENOUS AS NEEDED
Status: DISCONTINUED | OUTPATIENT
Start: 2021-07-16 | End: 2021-07-16

## 2021-07-16 RX ADMIN — PROPOFOL 55 MCG/KG/MIN: 10 INJECTION, EMULSION INTRAVENOUS at 08:32

## 2021-07-16 RX ADMIN — OXYCODONE HYDROCHLORIDE 5 MG: 5 TABLET ORAL at 11:15

## 2021-07-16 RX ADMIN — ONDANSETRON 4 MG: 2 INJECTION INTRAMUSCULAR; INTRAVENOUS at 08:38

## 2021-07-16 RX ADMIN — SODIUM CHLORIDE, SODIUM LACTATE, POTASSIUM CHLORIDE, AND CALCIUM CHLORIDE 125 ML/HR: .6; .31; .03; .02 INJECTION, SOLUTION INTRAVENOUS at 07:06

## 2021-07-16 RX ADMIN — KETOROLAC TROMETHAMINE 30 MG: 30 INJECTION, SOLUTION INTRAMUSCULAR at 08:44

## 2021-07-16 RX ADMIN — MIDAZOLAM 2 MG: 1 INJECTION INTRAMUSCULAR; INTRAVENOUS at 08:25

## 2021-07-16 RX ADMIN — FENTANYL CITRATE 100 MCG: 50 INJECTION INTRAMUSCULAR; INTRAVENOUS at 08:25

## 2021-07-16 RX ADMIN — PROPOFOL 50 MG: 10 INJECTION, EMULSION INTRAVENOUS at 08:30

## 2021-07-16 NOTE — DISCHARGE INSTRUCTIONS
[facility] - Dr Lindsay Barreto (Orthopedic Surgery)    Follow-up Appointments   Please call to set up/confirm your first postoperative visit with Dr Jeanne Vilchis in 10-14 days       Dressing and [address] A dressing has been placed on your hand/arm to keep the incisions clean  Keep your dressing clean and dry      o You may remove the dressing 5 days after surgery  Once the dressing is off, your incision can get wet while showering/bathing only  Do not soak the wound (in bath water, pool, lake, etc )  Otherwise, keep your incision covered with Band-Aids or light dressing  Keep it dry and do not apply any ointments   Wear a plastic bag over your dressing/splint whenever you take a shower or bath until you are allowed to remove it   Swelling is normal after surgery  Elevate your hand/arm so the surgical site is above your heart to decrease the swelling  Swelling is like water, it runs downhill  This is especially important for the first 72 hours after surgery  o The best way to elevate your hand/arm is with your fingers pointing towards the ceiling and your hand/arm above the level of the heart   o You can use pillows to help prop your hand/arm up when sitting or lying down   If you are experiencing pain, be sure you are elevating your hand/arm as often as possible   Apply an ice pack over your dressing/splint for 20 minutes of every hour for the first 3 days when you are awake  This can help to reduce swelling and inflammation  Be sure the ice pack is waterproof so it does not leak on the dressing/splint  A simple ice pack can be made by adding ten cubes and a small amount of water in a small zip-lock bag  Seal this small bag tightly  Place this small bag in a larger zip lock bag  Apply to the area in pain   If the dressing feels too tight in spite of elevation, loosen the outer wrap but do not remove the entire dressing     If you have exposed pins/wires, take clean gauze or a cotton tip applicator (like a Q-tip), get it wet in clean warm water mixed with non-scented hand soap (like Renu, [place], Dial, etc ), and gently wipe around the base of the pin where it comes through the skin once a day  Do not use water from a well to clean as this has bacteria in it and can contribute to infections  ACTIVITIES:  [facility] and straighten the parts of your hand, wrist, elbow, and shoulder that are not included in your surgical dressing or splint  Do this at least 6 times a day, as this will help decrease swelling and speed up your recovery  This includes your fingers  See the instructions listed below for finger motion  THIS IS VERY IMPORTANT FOR YOUR RECOVERY! POSTOPERATIVE CARE/CONCERNS:  Raul Velasquez You may experience some temporary numbness in your fingers   You should have very little to no bleeding on your dressing   Notify the office (see contact info at bottom of page) for any of the following:  o Excessive pain not relieved by rest, elevation, and pain medications  o Feeling that the dressing is too tight in spite of adequately elevating hand/arm  o Active bleeding through the dressing  o Drainage from the wound site or pin sites  o Foul odor from the dressing/wound  o Temperature greater that 101? F or chills  o Blue or excessively cold fingertips  o Numbness of the fingertips that does not improve in spite of adequately elevating hand/arm    PAIN MEDICATION:   Pain is a normal part of the recovery after surgery  The pain medication provided to you will help to decrease the discomfort but will not completely eliminate the pain   A prescription for a narcotic pain medicine (oxycodone or hydrocodone) and anti-inflammatory (naproxen) were called in to your pharmacy  Please take the anti-inflammatory medication AND over-the-counter acetaminophen (Tylenol) regularly  The instructions will be listed on the bottles   The narcotic pain medicine should be used for pain that is not controlled by these other medications and only for the first few days after surgery  The narcotic is HIGHLY ADDICTIVE and has many side effects such as causing constipation, dizziness, confusion, decreased breathing and more  It is safe to take for a short period of time after surgery  It is almost never prescribed for longer than a few weeks and never after one month for elective surgeries   Do not take narcotic or anti-inflammatories on an empty stomach   It is illegal to drive while taking narcotic pain medication   Your pain should decrease over the first few days after surgery which will allow you to take less pain medicine, increase the time between doses of medication, or stop taking all pain medicine        OFFICE CONTACT NUMBERS   Please call [phone] with any questions about appointments or any medical concerns

## 2021-07-16 NOTE — ANESTHESIA PREPROCEDURE EVALUATION
Procedure:  CARPAL TUNNEL RELEASE (Right Wrist)    Relevant Problems   CARDIO   (+) Chest pain      MUSCULOSKELETAL   (+) Chronic bilateral low back pain without sciatica   (+) Thoracic back pain        Physical Exam    Airway    Mallampati score: II  TM Distance: >3 FB  Neck ROM: full     Dental   No notable dental hx     Cardiovascular  Rhythm: regular, Rate: normal, Cardiovascular exam normal    Pulmonary  Pulmonary exam normal Breath sounds clear to auscultation,     Other Findings        Anesthesia Plan  ASA Score- 2     Anesthesia Type- IV sedation with anesthesia with ASA Monitors  Additional Monitors:   Airway Plan:           Plan Factors-    Chart reviewed  Existing labs reviewed  Patient summary reviewed  Patient is not a current smoker  Patient instructed to abstain from smoking on day of procedure  Patient did not smoke on day of surgery  Induction- intravenous  Postoperative Plan-     Informed Consent- Anesthetic plan and risks discussed with patient

## 2021-07-16 NOTE — H&P
Chief Complaint      Bilateral hand numbness        History of Present Illness      Nichol Keen is a 42 y o  female who presents to the office today for follow up of left DeQuervain's and right TFCC injury  Patient received steroid injections to both of these areas and patient states that these helped her pain greatly and no longer has this  Instead she describes numbness and tingling in her hands  States she was told previously she has carpal tunnel in her hands  She states she has been wearing braces on her wrists at night for approximately 1 month and feels like this helps some, but still will wake up with numbness  She has not worn anything on her elbows   Patient denies symptoms in the small finger       Patient is currently working as a care worker          Medical History        Past Medical History:   Diagnosis Date    Hypertension       Last assessed 4/29/2013             Surgical History         Past Surgical History:   Procedure Laterality Date    TUBAL LIGATION                No Known Allergies            Current Outpatient Medications on File Prior to Visit   Medication Sig Dispense Refill    butalbital-acetaminophen-caffeine (FIORICET,ESGIC) -40 mg per tablet Take 1 tablet by mouth every 6 (six) hours as needed for headaches 15 tablet 0    phentermine 37 5 MG capsule Take 37 5 mg by mouth every morning        tiZANidine (ZANAFLEX) 2 mg tablet Take 1 tablet (2 mg total) by mouth every 8 (eight) hours as needed for muscle spasms 30 tablet 0    topiramate (TOPAMAX) 25 mg sprinkle capsule Take 25 mg by mouth 2 (two) times a day        topiramate (TOPAMAX) 25 mg tablet TAKE 1 TABLET ORAL TWO TIMES A DAY        cyclobenzaprine (FLEXERIL) 10 mg tablet Take 1 tablet (10 mg total) by mouth daily at bedtime (Patient not taking: Reported on 3/11/2021) 30 tablet 1    Diclofenac Sodium (VOLTAREN) 1 % Apply 2 g topically 4 (four) times a day (Patient not taking: Reported on 4/1/2021) 100 g 2    fluticasone (FLONASE) 50 mcg/act nasal spray 2 sprays into each nostril daily (Patient not taking: Reported on 3/11/2021) 3 Bottle 1    gabapentin (NEURONTIN) 300 mg capsule Take 1 capsule (300 mg total) by mouth daily at bedtime (Patient not taking: Reported on 3/11/2021) 30 capsule 2    naproxen (NAPROSYN) 500 mg tablet Take 1 tablet (500 mg total) by mouth 2 (two) times a day with meals for 14 days 28 tablet 0      No current facility-administered medications on file prior to visit          Social History           Tobacco Use    Smoking status: Never Smoker    Smokeless tobacco: Never Used   Vaping Use    Vaping Use: Never used   Substance Use Topics    Alcohol use: No    Drug use: No               Family History   Problem Relation Age of Onset    Diabetes type II Father      Breast cancer Family      Cervical cancer Family      Stomach cancer Family      Hyperlipidemia Family      No Known Problems Mother      No Known Problems Sister      No Known Problems Daughter      No Known Problems Maternal Grandfather      No Known Problems Paternal Grandmother      No Known Problems Paternal Grandfather      No Known Problems Half-Sister      No Known Problems Half-Sister      No Known Problems Daughter      No Known Problems Daughter      No Known Problems Daughter      Breast cancer Maternal Aunt      Breast cancer Maternal Aunt      No Known Problems Paternal Aunt      No Known Problems Paternal Aunt      Breast cancer Cousin      No Known Problems Maternal Aunt      Breast cancer Cousin           Review of Systems      As stated in the HPI  All other systems were reviewed and are negative         Physical Exam      /89   Pulse 70   Ht 5' 1" (1 549 m)   Wt 68 9 kg (152 lb)   LMP 10/28/2019 Comment: tubal ligation  BMI 28 72 kg/m²      GENERAL: This is a well-developed, well-nourished, age-appropriate patient in no acute distress  The patient is alert and oriented x3   Pleasant and cooperative  Eyes: Anicteric sclerae  Extraocular movements appear intact  HENT: Nares are patent with no drainage  Lungs: There is equal chest rise on inspection  Breathing is non-labored with no audible wheezing  Cardiovascular: No cyanosis  No upper extremity lymphadema  Skin: Skin is warm to touch  No obvious skin lesions or rashes other than described below  Neurologic: No ataxia  Psychiatric: Mood and affect are appropriate      Right Upper Extremity:  No erythema, edema or ecchymosis  Skin is intact  Full elbow flexion/extension without ulnar nerve subluxation  DPC 0  Patient denies tenderness to palpation of TFCC, ulnar fovea and ECU  Full wrist flexion/extension without pain  No thenar atrophy  No hypothenar atrophy  No 1st dorsal interosseous atrophy  Positive Tinel's to the carpal tunnel  Positive Durkan's compression test   Positive Tinel's to cubital tunnel  Positive Elbow flexion compression test   5/5 Motor to APB  5/5 Motor to the FDI, FDP2, FDP5, EDC  Sensation intact to light touch in the median, radial and ulnar nerve distributions  Radial pulse 2+      Left Upper Extremity:  No erythema, edema or ecchymosis  Skin is intact  Full elbow flexion/extension without ulnar nerve subluxation  DPC 0  Patient has a cyst along her 1st dorsal compartment, but no tenderness here  Negative Finkelstein's test   No thenar atrophy  No hypothenar atrophy  No 1st dorsal interosseous atrophy  Positive Tinel's to the carpal tunnel  Positive Durkan's compression test   Mildly positive Tinel's to cubital tunnel  Negative Elbow flexion compression test   5/5 Motor to APB  5/5 Motor to the FDI, FDP2, FDP5, EDC  Sensation intact to light touch in the median, radial and ulnar nerve distributions  Radial pulse 2+      Cervical Spine:  Patient with well maintained cervical spine motion    Patient describes some slight radiating pain down the upper arm with Spurling's to the right, no symptoms to the left  No radicular symptoms with neck flexion/extenstion          Data Review      Labs:  None     Electrodiagnostic Testing:  Study from 07/22/2020 was personally reviewed by myself and demonstrate increased peak latency, decreased velocity and decreased amplitude consistent with bilateral carpal tunnel       Imaging:  None     Assessment and Plan   Diagnoses and all orders for this visit:     Carpal tunnel syndrome, bilateral     42year old female with improved left DeQuervain's and right TFCC injury improved with use of steroid injections  Patient also with bilateral carpal tunnel syndrome  I explained she may also have some cubital tunnel syndrome, but this is not seen on the nerve study and her symptoms are primarily median nerve distribution  I discussed that since the patient has continued to have symptoms despite nocturnal bracing, she would be a candidate for carpal tunnel release  I explained that if she continues to have symptoms, we may have to address the cubital tunnel in the future, but her symptoms may improve from just the carpal tunnel release  After discussion of the surgery and postoperative protocol, patient agrees to proceed with carpal tunnel release only  Risks, benefits and alternatives of the surgery were discussed with the patient today  Details of postoperative recovery and protocol were also discussed  Patient states understanding and agrees to proceed with carpal tunnel release surgery one-sided the time under IV sedation  We will have her come to a postoperative visit for suture removal 10 days after the 1st surgery          Follow Up:  After surgery     To Do Next Visit: Sutures out     PROCEDURES PERFORMED:  Procedures  No Procedures performed today

## 2021-07-16 NOTE — OP NOTE
DATE OF SURGERY: 7/16/2021    SURGEON: Maulik Cadena MD    PREOPERATIVE DIAGNOSIS:  Right carpal tunnel syndrome    POSTOPERATIVE DIAGNOSIS:  Same    PROCEDURE:  Right mini open carpal tunnel release    IMPLANTS: * No implants in log *     ASSISTANTS: * CARMEN Rich Assisting     ANESTHESIA: IV Sedation with Anesthesia    ESTIMATED BLOOD LOSS: Minimal     INTRAVENOUS FLUIDS: Per anesthesia    URINE OUTPUT:  No Cobos    TOURNIQUET TIME:  About 10 minutes    COMPLICATIONS:  None    ANTIBIOTICS:  None    SPECIMENS: * No specimens in log *         INDICATIONS: Francisca Aponte is a 42y o  year old female who sustained the above conditions  The indications for operative intervention were carpal tunnel syndrome refractory to nonoperative modalities  The alternatives to operative intervention included continued nonoperative care  The risks of the operative procedure were discussed in detail with the patient including but not limited to the risks of anesthesia, infection, injury to blood vessel/nerve, pain, stiffness, changes in sensation, and the need for repeat surgery  The patient understood these risks and alternatives and elected to proceed with surgery  DESCRIPTION OF PROCEDURE: The patient was seen in the preoperative holding area and identification was performed as per the institution's protocol  The patient was then brought to the operating room, a briefing was held and prophylactic antibiotics were not given  Anesthesia was induced  A tourniquet cuff was applied to the operative extremity, set at 250 mmHg The site was pre-scrubbed with chlorhexidine and prepped with ChloraPrep and draped in the usual sterile fashion  Following a timeout procedure, an incision starting distally at the intersection of Allred's cardinal line at the intersection of the longitudinal line drawn on the radial border of the 4th ray was made on the right hand  This was carried about 1 in proximally    Dissection was carried down through skin and subcutaneous tissue sweeping the fat away from the palmar fascia  The palmar fascia was incised longitudinally and retractors were placed to identify the transverse carpal ligament  Gentle sweeping of any muscle present revealed the transverse carpal ligament  This was incised using a knife from the proximal portion of the incision distally until 2 mm past the distal palmar fat  Distal release was confirmed and then retractors were placed proximally  A small subcutaneous pocket was then made superficial to the remaining transverse carpal ligament  sled and knife were then used to then incise the transverse carpal ligament proximally into the antebrachial fascia  Care was taken to preserve the contents of the carpal tunnel which were deep  Full release was confirmed both visually and by feel  The wounds were copiously irrigated and closed in layers including 4-0 nylon  Sterile dressing was applied    All sharps and sponge counts were correct and there were no complications  I attest that IZeke, was present and scrubbed for the entirety of the procedure  A physician assistant was required during the procedure for retraction, tissue handling, dissection and suturing  The patient was awoken from anesthesia in good condition and taken to the PACU         PLAN: The patient will follow up in 10-14 days

## 2021-07-16 NOTE — ANESTHESIA POSTPROCEDURE EVALUATION
Post-Op Assessment Note    CV Status:  Stable    Pain management: adequate     Mental Status:  Alert and awake   Hydration Status:  Euvolemic   PONV Controlled:  Controlled   Airway Patency:  Patent      Post Op Vitals Reviewed: Yes      Staff: Anesthesiologist         No complications documented      /76 (07/16/21 0855)    Temp 97 5 °F (36 4 °C) (07/16/21 0855)    Pulse 96 (07/16/21 0855)   Resp 13 (07/16/21 0855)    SpO2 96 % (07/16/21 0855)

## 2021-07-21 ENCOUNTER — TELEPHONE (OUTPATIENT)
Dept: OBGYN CLINIC | Facility: MEDICAL CENTER | Age: 46
End: 2021-07-21

## 2021-07-21 NOTE — TELEPHONE ENCOUNTER
I spoke with patient and daughter and provided her information from AVS   Patient is okay to remove dressing and shower after 5 days PO  Do not soak hand in dishwater, pool, bath water  Cover incision with bandaids  She asked if okay to let the incision open to air at night, I told her this was ok    PO appt scheduled for 7/27 at 10:45am

## 2021-07-21 NOTE — TELEPHONE ENCOUNTER
Patient is calling in requesting a call  She had sx with Dr Aceves 7/16 R wrist  She is requesting instructions in what to do at home? She wants to know when can she shower? And when can she take off her bandages?       Please advise,       Pt is Macedonian speaking *    Cb#: [phone]

## 2021-07-27 ENCOUNTER — OFFICE VISIT (OUTPATIENT)
Dept: OBGYN CLINIC | Facility: MEDICAL CENTER | Age: 46
End: 2021-07-27

## 2021-07-27 VITALS
SYSTOLIC BLOOD PRESSURE: 130 MMHG | DIASTOLIC BLOOD PRESSURE: 90 MMHG | HEART RATE: 77 BPM | HEIGHT: 61 IN | RESPIRATION RATE: 16 BRPM | BODY MASS INDEX: 28.89 KG/M2 | WEIGHT: 153 LBS

## 2021-07-27 DIAGNOSIS — M65.4 DE QUERVAIN'S TENOSYNOVITIS, LEFT: ICD-10-CM

## 2021-07-27 DIAGNOSIS — G56.02 LEFT CARPAL TUNNEL SYNDROME: ICD-10-CM

## 2021-07-27 DIAGNOSIS — Z98.890 S/P CARPAL TUNNEL RELEASE: Primary | ICD-10-CM

## 2021-07-27 PROCEDURE — 99024 POSTOP FOLLOW-UP VISIT: CPT | Performed by: ORTHOPAEDIC SURGERY

## 2021-07-27 NOTE — H&P (VIEW-ONLY)
Chief complaint  Left hand numbness and wrist pain    History of the Present Illness     Rosy Pace is a 42 y o  female 11 days s/p right carpal tunnel release, DOS 7/16/2021  She has left carpal tunnel release scheduled for 7/30/2021  Diane Richey is doing well  Her nighttime symptoms have improved with regards to the right hand  Continued numbness/tingling to left hand  Diane Richey notes pain tot he radial aspect of her left wrist  She did undergo a left De Quervain's CSI in the past, which did not resolve her pain permanently  Patient is currently working as a care worker  Past Medical History:   Diagnosis Date    Anesthesia     "a little disoriented when waking up"    Carpal tunnel syndrome, right     Dental crowns present     Fibromyalgia, primary     History of 2019 novel coronavirus disease (COVID-19) 05/2020    pt reports not hospitalized    Hypertension     Last assessed 4/29/2013 /pt denies    Migraine     Tachycardia     "with use of phentermine/once stopped taking this med it went away"    Uses Icelandic as primary spoken language     Wears glasses     reading       Past Surgical History:   Procedure Laterality Date    [place] REVISE MEDIAN N/CARPAL TUNNEL SURG Right 7/16/2021    Procedure: CARPAL TUNNEL RELEASE;  Surgeon:  Osmar Harding MD;  Location: [facility];  Service: Orthopedics    TUBAL LIGATION         No Known Allergies    Current Outpatient Medications on File Prior to Visit   Medication Sig Dispense Refill    butalbital-acetaminophen-caffeine (FIORICET,ESGIC) -40 mg per tablet Take 1 tablet by mouth every 6 (six) hours as needed for headaches 15 tablet 0    cyclobenzaprine (FLEXERIL) 10 mg tablet Take 1 tablet (10 mg total) by mouth daily at bedtime (Patient not taking: Reported on 3/11/2021) 30 tablet 1    Diclofenac Sodium (VOLTAREN) 1 % Apply 2 g topically 4 (four) times a day (Patient not taking: Reported on 4/1/2021) 100 g 2    fluticasone (FLONASE) 50 mcg/act nasal spray 2 sprays into each nostril daily (Patient not taking: Reported on 3/11/2021) 3 Bottle 1    gabapentin (NEURONTIN) 300 mg capsule Take 1 capsule (300 mg total) by mouth daily at bedtime (Patient not taking: Reported on 3/11/2021) 30 capsule 2    ibuprofen (Advil) 200 mg tablet Take by mouth every 6 (six) hours as needed for mild pain      naproxen (NAPROSYN) 500 mg tablet Take 1 tablet (500 mg total) by mouth 2 (two) times a day with meals for 14 days 28 tablet 0    [] oxyCODONE (ROXICODONE) 5 mg immediate release tablet Take 1 tablet (5 mg total) by mouth every 6 (six) hours as needed for severe pain for up to 10 daysMax Daily Amount: 20 mg 5 tablet 0    tiZANidine (ZANAFLEX) 2 mg tablet Take 1 tablet (2 mg total) by mouth every 8 (eight) hours as needed for muscle spasms (Patient not taking: Reported on 2021) 30 tablet 0     No current facility-administered medications on file prior to visit         Social History     Tobacco Use    Smoking status: Never Smoker    Smokeless tobacco: Never Used   Vaping Use    Vaping Use: Never used   Substance Use Topics    Alcohol use: No    Drug use: No       Family History   Problem Relation Age of Onset    Diabetes type II Father     Breast cancer Family     Cervical cancer Family     Stomach cancer Family     Hyperlipidemia Family     No Known Problems Mother     No Known Problems Sister     No Known Problems Daughter     No Known Problems Maternal Grandfather     No Known Problems Paternal Grandmother     No Known Problems Paternal Grandfather     No Known Problems Half-Sister     No Known Problems Half-Sister     No Known Problems Daughter     No Known Problems Daughter     No Known Problems Daughter     Breast cancer Maternal Aunt     Breast cancer Maternal Aunt     No Known Problems Paternal Aunt     No Known Problems Paternal Aunt     Breast cancer Cousin     No Known Problems Maternal Aunt     Breast cancer Cousin Review of Systems     As stated in the HPI  All other systems were reviewed and are negative  Physical Exam     /90   Pulse 77   Resp 16   Ht 5' 1" (1 549 m)   Wt 69 4 kg (153 lb)   LMP 10/28/2019 Comment: tubal ligation   BMI 28 91 kg/m²     GENERAL: This is a well-developed, well-nourished, age-appropriate patient in no acute distress  The patient is alert and oriented x3  Pleasant and cooperative  Eyes: Anicteric sclerae  Extraocular movements appear intact  HENT: Nares are patent with no drainage  Lungs: There is equal chest rise on inspection  Breathing is non-labored with no audible wheezing  Cardiovascular: No cyanosis  No upper extremity lymphadema  Skin: Skin is warm to touch  No obvious skin lesions or rashes other than described below  Neurologic: No ataxia  Psychiatric: Mood and affect are appropriate  Right upper extremity:   No erythema, ecchymosis or edema   Surgical incision is clean, dry with sutures intact   DPC 0   5/5 Motor to the APB, FDI, FDP2, FDP5, EDC  Sensation intact to light touch in the median, radial, and ulnar nerve distribution  Palmar cutaneous nerve is intact    Left upper extremity:   No erythema, ecchymosis or edema   5/5 Motor to the APB, FDI, FDP2, FDP5, EDC  Sensation intact to light touch in the radial, and ulnar nerve distribution, decreased in the median nerve distribution  + tinel's at the carpal tunnel, positive Durkan's compression test   1st dorsal compartment tender to palpation   Positive Finkelstein test  Swelling to the 1st dorsal part  [place] [id number]       Data Review       Imaging:  None to review     Assessment and Plan       42 y o  female 11 days s/p right carpal tunnel release with persistent left wrist pain and numbness and tingling to the hand    Sutures were removed today  She was advised to perform scar massage  Patient had plans to undergo surgical release of left carpal tunnel    Patient also has persistent pain and disability related to de Quervain tenosynovitis on the left  I discussed with her repeat corticosteroid injection with the risk of increased infection in the perioperative  Related to her carpal tunnel syndrome verses having surgical intervention at the same time as her carpal tunnel release  Patient wished to pursue operative intervention  Risks and benefits of the 1st dorsal compartment release were discussed with her with good understanding  She understands that the recovery will be slightly different than her contralateral side as she will have a splint on that will limit her wrist function for about 2 weeks      Follow Up: 10-14 days after surgery     To Do Next Visit: splint removal, suture removal     PROCEDURES PERFORMED:  Procedures  No Procedures performed today       Scribe Attestation    I,:  Baldo Morris am acting as a scribe while in the presence of the attending physician :       I,:  Sharda Rosenthal MD personally performed the services described in this documentation    as scribed in my presence :

## 2021-07-27 NOTE — PROGRESS NOTES
Chief complaint  Left hand numbness and wrist pain    History of the Present Illness     Janae Mclean is a 42 y o  female 11 days s/p right carpal tunnel release, DOS 7/16/2021  She has left carpal tunnel release scheduled for 7/30/2021  Theodora Alvarez is doing well  Her nighttime symptoms have improved with regards to the right hand  Continued numbness/tingling to left hand  Theodora Alvarez notes pain tot he radial aspect of her left wrist  She did undergo a left De Quervain's CSI in the past, which did not resolve her pain permanently  Patient is currently working as a care worker  Past Medical History:   Diagnosis Date    Anesthesia     "a little disoriented when waking up"    Carpal tunnel syndrome, right     Dental crowns present     Fibromyalgia, primary     History of 2019 novel coronavirus disease (COVID-19) 05/2020    pt reports not hospitalized    Hypertension     Last assessed 4/29/2013 /pt denies    Migraine     Tachycardia     "with use of phentermine/once stopped taking this med it went away"    Uses Cameroonian as primary spoken language     Wears glasses     reading       Past Surgical History:   Procedure Laterality Date    [place] REVISE MEDIAN N/CARPAL TUNNEL SURG Right 7/16/2021    Procedure: CARPAL TUNNEL RELEASE;  Surgeon:  Kate Parks MD;  Location[facility];  Service: Orthopedics    TUBAL LIGATION         No Known Allergies    Current Outpatient Medications on File Prior to Visit   Medication Sig Dispense Refill    butalbital-acetaminophen-caffeine (FIORICET,ESGIC) -40 mg per tablet Take 1 tablet by mouth every 6 (six) hours as needed for headaches 15 tablet 0    cyclobenzaprine (FLEXERIL) 10 mg tablet Take 1 tablet (10 mg total) by mouth daily at bedtime (Patient not taking: Reported on 3/11/2021) 30 tablet 1    Diclofenac Sodium (VOLTAREN) 1 % Apply 2 g topically 4 (four) times a day (Patient not taking: Reported on 4/1/2021) 100 g 2    fluticasone (FLONASE) 50 mcg/act nasal spray 2 sprays into each nostril daily (Patient not taking: Reported on 3/11/2021) 3 Bottle 1    gabapentin (NEURONTIN) 300 mg capsule Take 1 capsule (300 mg total) by mouth daily at bedtime (Patient not taking: Reported on 3/11/2021) 30 capsule 2    ibuprofen (Advil) 200 mg tablet Take by mouth every 6 (six) hours as needed for mild pain      naproxen (NAPROSYN) 500 mg tablet Take 1 tablet (500 mg total) by mouth 2 (two) times a day with meals for 14 days 28 tablet 0    [] oxyCODONE (ROXICODONE) 5 mg immediate release tablet Take 1 tablet (5 mg total) by mouth every 6 (six) hours as needed for severe pain for up to 10 daysMax Daily Amount: 20 mg 5 tablet 0    tiZANidine (ZANAFLEX) 2 mg tablet Take 1 tablet (2 mg total) by mouth every 8 (eight) hours as needed for muscle spasms (Patient not taking: Reported on 2021) 30 tablet 0     No current facility-administered medications on file prior to visit         Social History     Tobacco Use    Smoking status: Never Smoker    Smokeless tobacco: Never Used   Vaping Use    Vaping Use: Never used   Substance Use Topics    Alcohol use: No    Drug use: No       Family History   Problem Relation Age of Onset    Diabetes type II Father     Breast cancer Family     Cervical cancer Family     Stomach cancer Family     Hyperlipidemia Family     No Known Problems Mother     No Known Problems Sister     No Known Problems Daughter     No Known Problems Maternal Grandfather     No Known Problems Paternal Grandmother     No Known Problems Paternal Grandfather     No Known Problems Half-Sister     No Known Problems Half-Sister     No Known Problems Daughter     No Known Problems Daughter     No Known Problems Daughter     Breast cancer Maternal Aunt     Breast cancer Maternal Aunt     No Known Problems Paternal Aunt     No Known Problems Paternal Aunt     Breast cancer Cousin     No Known Problems Maternal Aunt     Breast cancer Cousin Review of Systems     As stated in the HPI  All other systems were reviewed and are negative  Physical Exam     /90   Pulse 77   Resp 16   Ht 5' 1" (1 549 m)   Wt 69 4 kg (153 lb)   LMP 10/28/2019 Comment: tubal ligation   BMI 28 91 kg/m²     GENERAL: This is a well-developed, well-nourished, age-appropriate patient in no acute distress  The patient is alert and oriented x3  Pleasant and cooperative  Eyes: Anicteric sclerae  Extraocular movements appear intact  HENT: Nares are patent with no drainage  Lungs: There is equal chest rise on inspection  Breathing is non-labored with no audible wheezing  Cardiovascular: No cyanosis  No upper extremity lymphadema  Skin: Skin is warm to touch  No obvious skin lesions or rashes other than described below  Neurologic: No ataxia  Psychiatric: Mood and affect are appropriate  Right upper extremity:   No erythema, ecchymosis or edema   Surgical incision is clean, dry with sutures intact   DPC 0   5/5 Motor to the APB, FDI, FDP2, FDP5, EDC  Sensation intact to light touch in the median, radial, and ulnar nerve distribution  Palmar cutaneous nerve is intact    Left upper extremity:   No erythema, ecchymosis or edema   5/5 Motor to the APB, FDI, FDP2, FDP5, EDC  Sensation intact to light touch in the radial, and ulnar nerve distribution, decreased in the median nerve distribution  + tinel's at the carpal tunnel, positive Durkan's compression test   1st dorsal compartment tender to palpation   Positive Finkelstein test  Swelling to the 1st dorsal part  [place] [address]       Data Review       Imaging:  None to review     Assessment and Plan       42 y o  female 11 days s/p right carpal tunnel release with persistent left wrist pain and numbness and tingling to the hand    Sutures were removed today  She was advised to perform scar massage  Patient had plans to undergo surgical release of left carpal tunnel    Patient also has persistent pain and disability related to de Quervain tenosynovitis on the left  I discussed with her repeat corticosteroid injection with the risk of increased infection in the perioperative  Related to her carpal tunnel syndrome verses having surgical intervention at the same time as her carpal tunnel release  Patient wished to pursue operative intervention  Risks and benefits of the 1st dorsal compartment release were discussed with her with good understanding  She understands that the recovery will be slightly different than her contralateral side as she will have a splint on that will limit her wrist function for about 2 weeks      Follow Up: 10-14 days after surgery     To Do Next Visit: splint removal, suture removal     PROCEDURES PERFORMED:  Procedures  No Procedures performed today       Scribe Attestation    I,:  Anjel Morris am acting as a scribe while in the presence of the attending physician :       I,:  Peter Navas MD personally performed the services described in this documentation    as scribed in my presence :

## 2021-07-28 ENCOUNTER — ANESTHESIA EVENT (OUTPATIENT)
Dept: PERIOP | Facility: HOSPITAL | Age: 46
End: 2021-07-28
Payer: COMMERCIAL

## 2021-07-28 NOTE — DISCHARGE INSTRUCTIONS
[facility] - Dr Edwina Carrasco (Orthopedic Surgery)    Follow-up Appointments   Please call to set up/confirm your first postoperative visit with Dr Thomas Ornelas in 10-14 days  Dressing and Wound Care   A dressing has been placed on your hand/arm to keep the incisions clean  Keep your dressing clean and dry  o Do not remove the surgical dressing/splint  It will be removed at your first postoperative office visit with the doctor or therapist     Álvaro Scott a plastic bag over your dressing/splint whenever you take a shower or bath until you are allowed to remove it   Swelling is normal after surgery  Elevate your hand/arm so the surgical site is above your heart to decrease the swelling  Swelling is like water, it runs downhill  This is especially important for the first 72 hours after surgery  o The best way to elevate your hand/arm is with your fingers pointing towards the ceiling and your hand/arm above the level of the heart   o You can use pillows to help prop your hand/arm up when sitting or lying down   If you are experiencing pain, be sure you are elevating your hand/arm as often as possible   Apply an ice pack over your dressing/splint for 20 minutes of every hour for the first 3 days when you are awake  This can help to reduce swelling and inflammation  Be sure the ice pack is waterproof so it does not leak on the dressing/splint  A simple ice pack can be made by adding ten cubes and a small amount of water in a small zip-lock bag  Seal this small bag tightly  Place this small bag in a larger zip lock bag  Apply to the area in pain   If the dressing feels too tight in spite of elevation, loosen the outer wrap but do not remove the entire dressing     If you have exposed pins/wires, take clean gauze or a cotton tip applicator (like a Q-tip), get it wet in clean warm water mixed with non-scented hand soap (like [place], [place], Dial, etc ), and gently wipe around the base of the pin where it comes through the skin once a day  Do not use water from a well to clean as this has bacteria in it and can contribute to infections  ACTIVITIES:  [facility] and straighten the parts of your hand, wrist, elbow, and shoulder that are not included in your surgical dressing or splint  Do this at least 6 times a day, as this will help decrease swelling and speed up your recovery  This includes your fingers  See the instructions listed below for finger motion  THIS IS VERY IMPORTANT FOR YOUR RECOVERY! POSTOPERATIVE CARE/CONCERNS:  Raul Velasquez You may experience some temporary numbness in your fingers   You should have very little to no bleeding on your dressing   Notify the office (see contact info at bottom of page) for any of the following:  o Excessive pain not relieved by rest, elevation, and pain medications  o Feeling that the dressing is too tight in spite of adequately elevating hand/arm  o Active bleeding through the dressing  o Drainage from the wound site or pin sites  o Foul odor from the dressing/wound  o Temperature greater that 101? F or chills  o Blue or excessively cold fingertips  o Numbness of the fingertips that does not improve in spite of adequately elevating hand/arm    PAIN MEDICATION:   Pain is a normal part of the recovery after surgery  The pain medication provided to you will help to decrease the discomfort but will not completely eliminate the pain   A prescription for a narcotic pain medicine (oxycodone or hydrocodone) was called in to your pharmacy  Please take an anti-inflammatory medication (ie Advil/Aleve) AND over-the-counter acetaminophen (Tylenol) regularly  The instructions will be listed on the bottles  The narcotic pain medicine should be used for pain that is not controlled by these other medications and only for the first few days after surgery   The narcotic is HIGHLY ADDICTIVE and has many side effects such as causing constipation, dizziness, confusion, decreased breathing and more  It is safe to take for a short period of time after surgery  It is almost never prescribed for longer than a few weeks and never after one month for elective surgeries   Do not take narcotic or anti-inflammatories on an empty stomach   It is illegal to drive while taking narcotic pain medication   Your pain should decrease over the first few days after surgery which will allow you to take less pain medicine, increase the time between doses of medication, or stop taking all pain medicine        OFFICE CONTACT NUMBERS   Please call [phone] with any questions about appointments or any medical concerns

## 2021-07-30 ENCOUNTER — ANESTHESIA (OUTPATIENT)
Dept: PERIOP | Facility: HOSPITAL | Age: 46
End: 2021-07-30
Payer: COMMERCIAL

## 2021-07-30 ENCOUNTER — HOSPITAL ENCOUNTER (OUTPATIENT)
Facility: HOSPITAL | Age: 46
Setting detail: OUTPATIENT SURGERY
Discharge: HOME | End: 2021-07-30
Attending: ORTHOPAEDIC SURGERY | Admitting: ORTHOPAEDIC SURGERY
Payer: COMMERCIAL

## 2021-07-30 VITALS
OXYGEN SATURATION: 98 % | HEIGHT: 61 IN | HEART RATE: 81 BPM | TEMPERATURE: 97.7 F | SYSTOLIC BLOOD PRESSURE: 111 MMHG | DIASTOLIC BLOOD PRESSURE: 63 MMHG | RESPIRATION RATE: 16 BRPM | WEIGHT: 152 LBS | BODY MASS INDEX: 28.7 KG/M2

## 2021-07-30 DIAGNOSIS — G56.03 BILATERAL CARPAL TUNNEL SYNDROME: ICD-10-CM

## 2021-07-30 DIAGNOSIS — M65.4 TENOSYNOVITIS, DE QUERVAIN: Primary | ICD-10-CM

## 2021-07-30 LAB
EXT PREGNANCY TEST URINE: NEGATIVE
EXT. CONTROL: NORMAL

## 2021-07-30 PROCEDURE — 25000 INCISION OF TENDON SHEATH: CPT | Performed by: PHYSICIAN ASSISTANT

## 2021-07-30 PROCEDURE — 64721 CARPAL TUNNEL SURGERY: CPT | Performed by: PHYSICIAN ASSISTANT

## 2021-07-30 PROCEDURE — 64721 CARPAL TUNNEL SURGERY: CPT | Performed by: ORTHOPAEDIC SURGERY

## 2021-07-30 PROCEDURE — 25000 INCISION OF TENDON SHEATH: CPT | Performed by: ORTHOPAEDIC SURGERY

## 2021-07-30 PROCEDURE — 81025 URINE PREGNANCY TEST: CPT | Performed by: ANESTHESIOLOGY

## 2021-07-30 RX ORDER — SODIUM CHLORIDE, SODIUM LACTATE, POTASSIUM CHLORIDE, CALCIUM CHLORIDE 600; 310; 30; 20 MG/100ML; MG/100ML; MG/100ML; MG/100ML
125 INJECTION, SOLUTION INTRAVENOUS CONTINUOUS
Status: DISCONTINUED | OUTPATIENT
Start: 2021-07-30 | End: 2021-07-30 | Stop reason: HOSPADM

## 2021-07-30 RX ORDER — HYDROMORPHONE HCL/PF 1 MG/ML
0.5 SYRINGE (ML) INJECTION
Status: DISCONTINUED | OUTPATIENT
Start: 2021-07-30 | End: 2021-07-30 | Stop reason: HOSPADM

## 2021-07-30 RX ORDER — MAGNESIUM HYDROXIDE 1200 MG/15ML
LIQUID ORAL AS NEEDED
Status: DISCONTINUED | OUTPATIENT
Start: 2021-07-30 | End: 2021-07-30 | Stop reason: HOSPADM

## 2021-07-30 RX ORDER — ONDANSETRON 2 MG/ML
4 INJECTION INTRAMUSCULAR; INTRAVENOUS ONCE AS NEEDED
Status: DISCONTINUED | OUTPATIENT
Start: 2021-07-30 | End: 2021-07-30 | Stop reason: HOSPADM

## 2021-07-30 RX ORDER — MEPERIDINE HYDROCHLORIDE 25 MG/ML
12.5 INJECTION INTRAMUSCULAR; INTRAVENOUS; SUBCUTANEOUS
Status: DISCONTINUED | OUTPATIENT
Start: 2021-07-30 | End: 2021-07-30 | Stop reason: HOSPADM

## 2021-07-30 RX ORDER — ONDANSETRON 2 MG/ML
INJECTION INTRAMUSCULAR; INTRAVENOUS AS NEEDED
Status: DISCONTINUED | OUTPATIENT
Start: 2021-07-30 | End: 2021-07-30

## 2021-07-30 RX ORDER — FENTANYL CITRATE/PF 50 MCG/ML
25 SYRINGE (ML) INJECTION
Status: DISCONTINUED | OUTPATIENT
Start: 2021-07-30 | End: 2021-07-30 | Stop reason: HOSPADM

## 2021-07-30 RX ORDER — KETOROLAC TROMETHAMINE 30 MG/ML
INJECTION, SOLUTION INTRAMUSCULAR; INTRAVENOUS AS NEEDED
Status: DISCONTINUED | OUTPATIENT
Start: 2021-07-30 | End: 2021-07-30

## 2021-07-30 RX ORDER — FENTANYL CITRATE 50 UG/ML
INJECTION, SOLUTION INTRAMUSCULAR; INTRAVENOUS AS NEEDED
Status: DISCONTINUED | OUTPATIENT
Start: 2021-07-30 | End: 2021-07-30

## 2021-07-30 RX ORDER — OXYCODONE HYDROCHLORIDE 5 MG/1
5 TABLET ORAL EVERY 6 HOURS PRN
Qty: 5 TABLET | Refills: 0 | Status: SHIPPED | OUTPATIENT
Start: 2021-07-30

## 2021-07-30 RX ORDER — MIDAZOLAM HYDROCHLORIDE 2 MG/2ML
INJECTION, SOLUTION INTRAMUSCULAR; INTRAVENOUS AS NEEDED
Status: DISCONTINUED | OUTPATIENT
Start: 2021-07-30 | End: 2021-07-30

## 2021-07-30 RX ORDER — PROPOFOL 10 MG/ML
INJECTION, EMULSION INTRAVENOUS CONTINUOUS PRN
Status: DISCONTINUED | OUTPATIENT
Start: 2021-07-30 | End: 2021-07-30

## 2021-07-30 RX ORDER — PROPOFOL 10 MG/ML
INJECTION, EMULSION INTRAVENOUS AS NEEDED
Status: DISCONTINUED | OUTPATIENT
Start: 2021-07-30 | End: 2021-07-30

## 2021-07-30 RX ORDER — OXYCODONE HYDROCHLORIDE 5 MG/1
5 TABLET ORAL EVERY 6 HOURS PRN
Status: DISCONTINUED | OUTPATIENT
Start: 2021-07-30 | End: 2021-07-30 | Stop reason: HOSPADM

## 2021-07-30 RX ADMIN — FENTANYL CITRATE 25 MCG: 50 INJECTION INTRAMUSCULAR; INTRAVENOUS at 08:44

## 2021-07-30 RX ADMIN — PROPOFOL 50 MG: 10 INJECTION, EMULSION INTRAVENOUS at 07:44

## 2021-07-30 RX ADMIN — PROPOFOL 70 MCG/KG/MIN: 10 INJECTION, EMULSION INTRAVENOUS at 07:44

## 2021-07-30 RX ADMIN — ONDANSETRON 4 MG: 2 INJECTION INTRAMUSCULAR; INTRAVENOUS at 07:46

## 2021-07-30 RX ADMIN — SODIUM CHLORIDE, SODIUM LACTATE, POTASSIUM CHLORIDE, AND CALCIUM CHLORIDE 125 ML/HR: .6; .31; .03; .02 INJECTION, SOLUTION INTRAVENOUS at 06:46

## 2021-07-30 RX ADMIN — KETOROLAC TROMETHAMINE 30 MG: 30 INJECTION, SOLUTION INTRAMUSCULAR; INTRAVENOUS at 08:09

## 2021-07-30 RX ADMIN — MIDAZOLAM 2 MG: 1 INJECTION INTRAMUSCULAR; INTRAVENOUS at 07:38

## 2021-07-30 RX ADMIN — FENTANYL CITRATE 25 MCG: 50 INJECTION INTRAMUSCULAR; INTRAVENOUS at 08:31

## 2021-07-30 RX ADMIN — FENTANYL CITRATE 100 MCG: 50 INJECTION INTRAMUSCULAR; INTRAVENOUS at 07:38

## 2021-07-30 NOTE — OP NOTE
DATE OF SURGERY: 7/30/2021    SURGEON: Dianna Bajwa MD    PREOPERATIVE DIAGNOSIS:   1  Left carpal tunnel syndrome  2  Left de Quervain tenosynovitis    POSTOPERATIVE DIAGNOSIS:  Same    PROCEDURE:   1  Left mini open carpal tunnel release  2  Left 1st dorsal compartment release    IMPLANTS: * No implants in log *     ASSISTANTS:   * Jessica Lal PA-C - Assisting       ANESTHESIA: IV Sedation with Anesthesia    ESTIMATED BLOOD LOSS: Minimal     INTRAVENOUS FLUIDS: Per anesthesia    URINE OUTPUT:  No Cobos    TOURNIQUET TIME:  Per Anesthesia    COMPLICATIONS:  None    ANTIBIOTICS:  None    SPECIMENS: * No specimens in log *         INDICATIONS: Venecia Travis is a 42y o  year old female who sustained the above conditions  The indications for operative intervention were de Quervain tenosynovitis and carpal tunnel syndrome refractory to nonoperative modalities  The alternatives to operative intervention included continued nonoperative care  The risks of the operative procedure were discussed in detail with the patient including but not limited to the risks of anesthesia, infection, injury to blood vessel/nerve, pain, stiffness, changes in sensation, and the need for repeat surgery  The patient understood these risks and alternatives and elected to proceed with surgery  DESCRIPTION OF PROCEDURE: The patient was seen in the preoperative holding area and identification was performed as per the institution's protocol  The patient was then brought to the operating room, a briefing was held and prophylactic antibiotics were given  Anesthesia was induced  A tourniquet cuff was applied to the operative extremity, set at 250 mmHg The site was pre-scrubbed with chlorhexidine and prepped with ChloraPrep and draped in the usual sterile fashion         Following a timeout procedure, an incision starting distally at the intersection of Allred's cardinal line at the intersection of the longitudinal line drawn on the radial border of the 4th ray was made on the left hand  This was carried about 1 in proximally  Dissection was carried down through skin and subcutaneous tissue sweeping the fat away from the palmar fascia  The palmar fascia was incised longitudinally and retractors were placed to identify the transverse carpal ligament  Gentle sweeping of any muscle present revealed the transverse carpal ligament  This was incised using a knife from the proximal portion of the incision distally until 2 mm past the distal palmar fat  Distal release was confirmed and then retractors were placed proximally  A small subcutaneous pocket was then made superficial to the remaining transverse carpal ligament  sled and knife were then used to then incise the transverse carpal ligament proximally into the antebrachial fascia  Care was taken to preserve the contents of the carpal tunnel which were deep  Full release was confirmed both visually and by feel  a 3 cm incision was made directly over the radial aspect of radial styloid  Dissection was carried down through skin and subcutaneous tissue sharply and then once the subcutaneous fat was identified, gentle spreading and retraction of branches of the superficial radial nerve, if identified, was performed  Ready identification of the 1st dorsal compartment was made  An incision was made on the dorsal aspect of the 1st dorsal compartment  The extensor pollicis brevis was identified in the same compartment as the abductor pollicis longus  There were multiple slips of the abductor pollicis longus and there was significant tenosynovium and inflammatory tissue covering all slips  This was sharply excised  Thumb was taken through range of motion there was no catching or crepitance    Wrist and fingers were taken through motion as well as the forearm and there was no evidence of volar dislocation of the tendons        The wounds were copiously irrigated and closed in layers including 4-0 nylon on the skin on both incisions  Sterile dressing and a thumb spica splint were applied  All sharps and sponge counts were correct and there were no complications  I attest that I, Kari Nyhan, was present and scrubbed for the entirety of the procedure  No qualified resident was available to assist with this case  and A physician assistant was required during the procedure for retraction, tissue handling, dissection and suturing  The patient was awoken from anesthesia in good condition and taken to the PACU         PLAN: The patient will follow up in 10-14 days

## 2021-08-10 ENCOUNTER — OFFICE VISIT (OUTPATIENT)
Dept: OBGYN CLINIC | Facility: MEDICAL CENTER | Age: 46
End: 2021-08-10

## 2021-08-10 VITALS
WEIGHT: 152 LBS | SYSTOLIC BLOOD PRESSURE: 144 MMHG | HEART RATE: 80 BPM | HEIGHT: 61 IN | BODY MASS INDEX: 28.7 KG/M2 | DIASTOLIC BLOOD PRESSURE: 92 MMHG

## 2021-08-10 DIAGNOSIS — Z98.890 S/P CARPAL TUNNEL RELEASE: Primary | ICD-10-CM

## 2021-08-10 PROCEDURE — 3008F BODY MASS INDEX DOCD: CPT | Performed by: ORTHOPAEDIC SURGERY

## 2021-08-10 PROCEDURE — 99024 POSTOP FOLLOW-UP VISIT: CPT | Performed by: ORTHOPAEDIC SURGERY

## 2021-08-10 NOTE — PROGRESS NOTES
History of the Present Illness     Helio Us is a 42 y o  female who presents to the office today for a follow up evaluation s/p left mini open carpal tunnel release and left 1st dorsal compartment release on  07/30/2021  She is also status post right mini open carpal release on 07/16/2021  Patient states she is doing very well postoperatively  She states her numbness to the left hand completely improved after surgery  She did have continued numbness 1 day after her right sided surgery but this resolved  She does not a bit  of soreness at the right-sided incision and she cannot  with force with the right hand  Overall her pain is well controlled  She notes no new injuries  She notes no signs of infection  She is very happy with the results of surgery  Physical Exam     /92   Pulse 80   Ht 5' 1" (1 549 m)   Wt 68 9 kg (152 lb)   LMP 10/28/2019 Comment: tubal ligation   BMI 28 72 kg/m²     Right wrist   Skin intact, incision well healed   No erythema or ecchymosis   No swelling   mildly tender at the incision  [place] [id number]   Full wrist motion in flexion and extension   5/5 Motor to the APB, FDI, FDP2, FDP5, EDC  Sensation intact to light touch in the median, radial, and ulnar nerve distribution  Palmar cutaneous nerve intact  Brisk capillary refill noted to all digits       left wrist   Incisions are clean, dry, and intact at the 1st dorsal compartment hand carpal tunnel   No erythema or drainage noted   Mild ecchymosis is swelling noted about the wrist and fingers   DPC 0   Full wrist flexion and extension   Nontender to palpation  5/5 Motor to the APB, FDI, FDP2, FDP5, EDC  Sensation intact to light touch in the median, radial, and ulnar nerve distribution  Palmar cutaneous nerve intact  Superficial radial nerve intact  Brisk capillary refill noted to all digits    Data Review       Imaging:   none today    Assessment and Plan      21-year-old female status post above surgeries    Patient continues to do well postoperatively  We discussed this point time she should initiate scar massage  She may perform activities as tolerated with no restrictions  She may now get the incision sites wet but should avoid the pool for another 2-3 days  I will see her back on an as needed basis        Follow Up: PRN    To Do Next Visit:     PROCEDURES PERFORMED:  Procedures  No Procedures performed today    Scribe Attestation    I,:  Chuck Escoto MA am acting as a scribe while in the presence of the attending physician :       I,:  Israel Fowler MD personally performed the services described in this documentation    as scribed in my presence :

## 2022-01-03 ENCOUNTER — HOSPITAL ENCOUNTER (OUTPATIENT)
Dept: MAMMOGRAPHY | Facility: CLINIC | Age: 47
Discharge: HOME | End: 2022-01-03
Payer: COMMERCIAL

## 2022-01-03 DIAGNOSIS — Z12.31 ENCOUNTER FOR SCREENING MAMMOGRAM FOR MALIGNANT NEOPLASM OF BREAST: ICD-10-CM

## 2022-01-03 DIAGNOSIS — Z12.31 BREAST CANCER SCREENING BY MAMMOGRAM: ICD-10-CM

## 2022-01-03 PROCEDURE — 77067 SCR MAMMO BI INCL CAD: CPT

## 2022-01-03 PROCEDURE — 77063 BREAST TOMOSYNTHESIS BI: CPT

## 2022-03-09 NOTE — PROGRESS NOTES
Daily Note     Today's date: 2019  Patient name: Liv Villalta  : 1975  MRN: [id number]  Referring provider: Young Farr PA-C  Dx:   Encounter Diagnosis     ICD-10-CM    1  Chronic bilateral low back pain without sciatica M54 5     G89 29                   Subjective: Regina Milan reported "I was up very late last night because of holiday parties so I think that is why I am more stiff and painful today "      Objective: See treatment diary below      Assessment: Tolerated treatment well  Patient would benefit from continued PT  Regina Milan was able to progress her abdominal strengthening and increase her resistance for her scapular strengthening as seen below  She reported relief following IASTM  She reported notable fatigue with physio ball presses through her abdominal and UE  Due to this fatigue next session recommend a decrease in repetitions  Plan: Continue per plan of care        Precautions: none      Manual  2019          STM LB Ac Gold          STM Mike Shoulders Ac Gold                                                     Exercise Diary            TrA contractions 3x10 3" 3x10 3" 3x10 3"          TrA Leg Fallouts 3x10x2 3" 3x10x2 3" 3x10x2 3"          REIP 1x10 1x10           Physioball press   OR 3x15 OR 3x15          LTR   2x10                                    Standing Rows Red 3x15 Grn 3x15 Blue 3x15          Standing Straight APD Red 3x15 Grn 3x15 Blue 3x15          Scapular retraction w/ ER  Red 3x12 Red 3x12                                                                                                                               HEP teaching and return demonstration                 Modalities              HP 10' Detail Level: Zone Plan: pt has finished 5 months of tx, this may be her last month of tx, pt to follow up in our office in 1 month\\nEG to confirm Samples Given: CeraVe AM lotion Continue Regimen: Absorica 40mg x 2 pills po qd (OP pharm, 0 refills)\\nClinda lotion qd to aa as directed

## 2022-07-13 ENCOUNTER — CLINICAL SUPPORT (OUTPATIENT)
Dept: FAMILY MEDICINE CLINIC | Facility: CLINIC | Age: 47
End: 2022-07-13

## 2022-07-13 DIAGNOSIS — Z23 ENCOUNTER FOR IMMUNIZATION: Primary | ICD-10-CM

## 2022-07-13 PROCEDURE — 86580 TB INTRADERMAL TEST: CPT

## 2022-07-15 ENCOUNTER — CLINICAL SUPPORT (OUTPATIENT)
Dept: FAMILY MEDICINE CLINIC | Facility: CLINIC | Age: 47
End: 2022-07-15

## 2022-07-15 DIAGNOSIS — Z11.1 ENCOUNTER FOR PPD SKIN TEST READING: Primary | ICD-10-CM

## 2022-07-15 LAB
INDURATION: 0 MM
TB SKIN TEST: NEGATIVE

## 2022-07-25 ENCOUNTER — CLINICAL SUPPORT (OUTPATIENT)
Dept: FAMILY MEDICINE CLINIC | Facility: CLINIC | Age: 47
End: 2022-07-25

## 2022-07-25 DIAGNOSIS — Z23 ENCOUNTER FOR IMMUNIZATION: Primary | ICD-10-CM

## 2022-07-25 PROCEDURE — 86580 TB INTRADERMAL TEST: CPT

## 2022-07-27 ENCOUNTER — CLINICAL SUPPORT (OUTPATIENT)
Dept: FAMILY MEDICINE CLINIC | Facility: CLINIC | Age: 47
End: 2022-07-27

## 2022-07-27 DIAGNOSIS — Z11.1 ENCOUNTER FOR PPD SKIN TEST READING: Primary | ICD-10-CM

## 2022-07-27 LAB
INDURATION: 0 MM
TB SKIN TEST: NEGATIVE

## 2022-09-06 ENCOUNTER — APPOINTMENT (OUTPATIENT)
Dept: LAB | Facility: CLINIC | Age: 47
End: 2022-09-06
Payer: COMMERCIAL

## 2022-09-06 ENCOUNTER — OFFICE VISIT (OUTPATIENT)
Dept: FAMILY MEDICINE CLINIC | Facility: CLINIC | Age: 47
End: 2022-09-06

## 2022-09-06 VITALS
OXYGEN SATURATION: 98 % | HEART RATE: 101 BPM | BODY MASS INDEX: 29.1 KG/M2 | SYSTOLIC BLOOD PRESSURE: 130 MMHG | DIASTOLIC BLOOD PRESSURE: 80 MMHG | TEMPERATURE: 98.7 F | RESPIRATION RATE: 16 BRPM | WEIGHT: 154 LBS

## 2022-09-06 DIAGNOSIS — Z11.59 ENCOUNTER FOR HEPATITIS C SCREENING TEST FOR LOW RISK PATIENT: ICD-10-CM

## 2022-09-06 DIAGNOSIS — Z11.4 ENCOUNTER FOR SCREENING FOR HUMAN IMMUNODEFICIENCY VIRUS (HIV): ICD-10-CM

## 2022-09-06 DIAGNOSIS — Z11.4 ENCOUNTER FOR SCREENING FOR HUMAN IMMUNODEFICIENCY VIRUS (HIV): Primary | ICD-10-CM

## 2022-09-06 DIAGNOSIS — Z11.3 ROUTINE SCREENING FOR STI (SEXUALLY TRANSMITTED INFECTION): ICD-10-CM

## 2022-09-06 PROCEDURE — 86592 SYPHILIS TEST NON-TREP QUAL: CPT

## 2022-09-06 PROCEDURE — 99214 OFFICE O/P EST MOD 30 MIN: CPT | Performed by: NURSE PRACTITIONER

## 2022-09-06 PROCEDURE — 36415 COLL VENOUS BLD VENIPUNCTURE: CPT

## 2022-09-06 PROCEDURE — 87389 HIV-1 AG W/HIV-1&-2 AB AG IA: CPT

## 2022-09-06 PROCEDURE — 86803 HEPATITIS C AB TEST: CPT

## 2022-09-06 NOTE — PROGRESS NOTES
Assessment/Plan:    Courtney Whitt was seen today for possible std  Diagnoses and all orders for this visit:    Encounter for screening for human immunodeficiency virus (HIV)  -     HIV 1/2 Antigen/Antibody (1th Generation) w Reflex [facility]; Future    Encounter for hepatitis C screening test for low risk patient  -     Hepatitis C antibody; Future    Routine screening for STI (sexually transmitted infection)  -     Chlamydia/GC amplified DNA by PCR; Future  -     RPR; Future          Return if symptoms worsen or fail to improve  Subjective:     Tia Helm is a 41 y o  female who  has a past medical history of Anesthesia, Carpal tunnel syndrome, right, Dental crowns present, Fibromyalgia, primary, History of 2019 novel coronavirus disease (COVID-19), Hypertension, Migraine, Tachycardia, Uses [place] as primary spoken language, and Wears glasses  who presented to the office today for same day visit  Patient reports possible STI exposure  Reports that her  had penile itching last week and so now she is concerned that he has an STI  She is asymptomatic       The following portions of the patient's history were reviewed and updated as appropriate: allergies, current medications, past family history, past medical history, past social history, past surgical history and problem list     Current Outpatient Medications on File Prior to Visit   Medication Sig Dispense Refill    butalbital-acetaminophen-caffeine (FIORICET,ESGIC) -40 mg per tablet Take 1 tablet by mouth every 6 (six) hours as needed for headaches 15 tablet 0    ibuprofen (Advil) 200 mg tablet Take by mouth every 6 (six) hours as needed for mild pain      [DISCONTINUED] cyclobenzaprine (FLEXERIL) 10 mg tablet Take 1 tablet (10 mg total) by mouth daily at bedtime (Patient not taking: Reported on 3/11/2021) 30 tablet 1    [DISCONTINUED] Diclofenac Sodium (VOLTAREN) 1 % Apply 2 g topically 4 (four) times a day (Patient not taking: Reported on 4/1/2021) 100 g 2    [DISCONTINUED] fluticasone (FLONASE) 50 mcg/act nasal spray 2 sprays into each nostril daily (Patient not taking: Reported on 3/11/2021) 3 Bottle 1    [DISCONTINUED] gabapentin (NEURONTIN) 300 mg capsule Take 1 capsule (300 mg total) by mouth daily at bedtime (Patient not taking: Reported on 3/11/2021) 30 capsule 2    [DISCONTINUED] naproxen (NAPROSYN) 500 mg tablet Take 1 tablet (500 mg total) by mouth 2 (two) times a day with meals for 14 days 28 tablet 0    [DISCONTINUED] oxyCODONE (ROXICODONE) 5 mg immediate release tablet Take 1 tablet (5 mg total) by mouth every 6 (six) hours as needed for severe pain for up to 5 dosesMax Daily Amount: 20 mg 5 tablet 0    [DISCONTINUED] tiZANidine (ZANAFLEX) 2 mg tablet Take 1 tablet (2 mg total) by mouth every 8 (eight) hours as needed for muscle spasms (Patient not taking: Reported on 7/27/2021) 30 tablet 0     No current facility-administered medications on file prior to visit  Review of Systems   Constitutional: Negative for chills and fever  HENT: Negative for ear pain and sore throat  Eyes: Negative for pain and visual disturbance  Respiratory: Negative for cough and shortness of breath  Cardiovascular: Negative for chest pain and palpitations  Gastrointestinal: Negative for abdominal pain and vomiting  Genitourinary: Negative for dysuria and hematuria  Musculoskeletal: Negative for arthralgias and back pain  Skin: Negative for color change and rash  Neurological: Negative for seizures and syncope  All other systems reviewed and are negative  Objective:    /80 (BP Location: Left arm, Patient Position: Sitting, Cuff Size: Standard)   Pulse 101   Temp 98 7 °F (37 1 °C) (Temporal)   Resp 16   Wt 69 9 kg (154 lb)   LMP 10/28/2019 Comment: tubal ligation   SpO2 98%   BMI 29 10 kg/m²     Physical Exam  Vitals and nursing note reviewed     Constitutional:       General: She is not in acute distress  Appearance: She is well-developed  She is not diaphoretic  HENT:      Head: Normocephalic and atraumatic  Eyes:      Conjunctiva/sclera: Conjunctivae normal       Pupils: Pupils are equal, round, and reactive to light  Cardiovascular:      Rate and Rhythm: Normal rate and regular rhythm  Pulmonary:      Effort: Pulmonary effort is normal  No respiratory distress  Breath sounds: Normal breath sounds  No wheezing  Abdominal:      General: Bowel sounds are normal  There is no distension  Palpations: Abdomen is soft  Tenderness: There is no abdominal tenderness  Musculoskeletal:         General: No deformity  Normal range of motion  Cervical back: Normal range of motion and neck supple  Lymphadenopathy:      Cervical: No cervical adenopathy  Skin:     General: Skin is warm and dry  Capillary Refill: Capillary refill takes less than 2 seconds  Findings: No rash  Neurological:      Mental Status: She is alert and oriented to person, place, and time     Psychiatric:         Behavior: Behavior normal          YIN Sanchez  09/06/22  2:45 PM

## 2022-09-07 LAB
HCV AB SER QL: NORMAL
HIV 1+2 AB+HIV1 P24 AG SERPL QL IA: NORMAL
RPR SER QL: NORMAL

## 2022-09-14 ENCOUNTER — APPOINTMENT (OUTPATIENT)
Dept: LAB | Facility: CLINIC | Age: 47
End: 2022-09-14
Payer: COMMERCIAL

## 2022-09-14 DIAGNOSIS — Z11.3 ROUTINE SCREENING FOR STI (SEXUALLY TRANSMITTED INFECTION): ICD-10-CM

## 2022-09-14 PROCEDURE — 87491 CHLMYD TRACH DNA AMP PROBE: CPT

## 2022-09-14 PROCEDURE — 87591 N.GONORRHOEAE DNA AMP PROB: CPT

## 2022-09-15 LAB
C TRACH DNA SPEC QL NAA+PROBE: NEGATIVE
N GONORRHOEA DNA SPEC QL NAA+PROBE: NEGATIVE

## 2022-09-23 ENCOUNTER — OFFICE VISIT (OUTPATIENT)
Dept: FAMILY MEDICINE CLINIC | Facility: CLINIC | Age: 47
End: 2022-09-23

## 2022-09-23 VITALS
HEIGHT: 61 IN | SYSTOLIC BLOOD PRESSURE: 120 MMHG | DIASTOLIC BLOOD PRESSURE: 78 MMHG | OXYGEN SATURATION: 99 % | WEIGHT: 156 LBS | RESPIRATION RATE: 16 BRPM | TEMPERATURE: 97.9 F | HEART RATE: 71 BPM | BODY MASS INDEX: 29.45 KG/M2

## 2022-09-23 DIAGNOSIS — G43.709 CHRONIC MIGRAINE WITHOUT AURA WITHOUT STATUS MIGRAINOSUS, NOT INTRACTABLE: ICD-10-CM

## 2022-09-23 DIAGNOSIS — E66.3 OVERWEIGHT: ICD-10-CM

## 2022-09-23 DIAGNOSIS — Z13.31 DEPRESSION SCREEN: ICD-10-CM

## 2022-09-23 DIAGNOSIS — H52.00 HYPERMETROPIA, UNSPECIFIED LATERALITY: ICD-10-CM

## 2022-09-23 DIAGNOSIS — N64.4 BREAST PAIN, LEFT: ICD-10-CM

## 2022-09-23 DIAGNOSIS — Z00.00 ANNUAL PHYSICAL EXAM: Primary | ICD-10-CM

## 2022-09-23 DIAGNOSIS — Z23 ENCOUNTER FOR VACCINATION: ICD-10-CM

## 2022-09-23 PROCEDURE — 90686 IIV4 VACC NO PRSV 0.5 ML IM: CPT | Performed by: PHYSICIAN ASSISTANT

## 2022-09-23 PROCEDURE — 90471 IMMUNIZATION ADMIN: CPT | Performed by: PHYSICIAN ASSISTANT

## 2022-09-23 PROCEDURE — 99396 PREV VISIT EST AGE 40-64: CPT | Performed by: PHYSICIAN ASSISTANT

## 2022-09-23 RX ORDER — BUTALBITAL, ACETAMINOPHEN AND CAFFEINE 50; 325; 40 MG/1; MG/1; MG/1
1 TABLET ORAL EVERY 6 HOURS PRN
Qty: 15 TABLET | Refills: 0 | Status: SHIPPED | OUTPATIENT
Start: 2022-09-23

## 2022-09-23 NOTE — PROGRESS NOTES
[address] [facility]INDER    NAME: Felix Morrissey  AGE: 41 y o  SEX: female  : 1975     DATE: 2022     Assessment and Plan:     Problem List Items Addressed This Visit        Cardiovascular and Mediastinum    Migraine headache     - Stable  Continue Fioricet as needed  - PDMP reviewed  No red flags noted  Relevant Medications    butalbital-acetaminophen-caffeine (FIORICET,ESGIC) -40 mg per tablet       Other    Overweight    Relevant Orders    CBC and differential    Comprehensive metabolic panel    Lipid panel    Hemoglobin A1C    TSH, 3rd generation with Free T4 reflex    Annual physical exam - Primary      Other Visit Diagnoses     Depression screen        Hypermetropia, unspecified laterality        Relevant Orders    Ambulatory Referral to Ophthalmology    Encounter for vaccination        Relevant Orders    influenza vaccine, quadrivalent, 0 5 mL, preservative-free, for adult and pediatric patients 6 mos+ (AFLURIA, FLUARIX, FLULAVAL, FLUZONE) (Completed)    Breast pain, left        Relevant Orders    US breast left limited (diagnostic)    Mammo diagnostic left w cad          Left breast pain  - Will obtain diagnostic mammogram and ultrasound of left breast for further evaluation     - Advised to apply ice/ heating pad as needed to affected area  - Advised to take Tylenol/ ibuprofen as needed for pain  Immunizations and preventive care screenings were discussed with patient today  Appropriate education was printed on patient's after visit summary     - I have recommended that this patient have a immunization for Tdap but she declines at this time  I have discussed the risks and benefits of this examination with her  The patient verbalizes understanding        Counseling:  Alcohol/drug use: discussed moderation in alcohol intake, the recommendations for healthy alcohol use, and avoidance of illicit drug use  Dental Health: discussed importance of regular tooth brushing, flossing, and dental visits  Injury prevention: discussed safety/seat belts, safety helmets, smoke detectors, carbon dioxide detectors, and smoking near bedding or upholstery  Sexual health: discussed sexually transmitted diseases, partner selection, use of condoms, avoidance of unintended pregnancy, and contraceptive alternatives  · Exercise: the importance of regular exercise/physical activity was discussed  Recommend exercise 3-5 times per week for at least 30 minutes  BMI Counseling: Body mass index is 29 48 kg/m²  The BMI is above normal  Nutrition recommendations include decreasing portion sizes, encouraging healthy choices of fruits and vegetables and consuming healthier snacks  Exercise recommendations include moderate physical activity 150 minutes/week  No pharmacotherapy was ordered  Rationale for BMI follow-up plan is due to patient being overweight or obese  Depression Screening and Follow-up Plan: Patient was screened for depression during today's encounter  They screened negative with a PHQ-2 score of 0  Return in about 1 year (around 9/23/2023) for Annual physical      Chief Complaint:     Chief Complaint   Patient presents with    Physical Exam    Breast Pain     L side       History of Present Illness:     Adult Annual Physical   Patient here for a comprehensive physical exam    Patient notes she does follow with Dr Za Torres for both her gynecology care and for weight loss  Patient notes she had been taking phentermine for the past few months, but is no longer taking it  Patient notes she was able to lose weight which she is happy about     - Patient notes she does continue to have stressed because 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  Also, patient notes she has 4 daughters and 1 of her daughters is planning a sweet 12 birthday party and it is causing her lot of stress  Patient notes she does occasionally have migraines, but she takes the Fioricet which helps  Patient notes for the past 2 months she has been experiencing intermittent left breast pain  Patient denies any trauma or injury to the area  Patient denies any palpable lumps, redness, discoloration, nipple discharge  Diet and Physical Activity  · Diet/Nutrition: well balanced diet  · Exercise: no formal exercise  Depression Screening  PHQ-2/9 Depression Screening    Little interest or pleasure in doing things: 0 - not at all  Feeling down, depressed, or hopeless: 0 - not at all  PHQ-2 Score: 0  PHQ-2 Interpretation: Negative depression screen       General Health  · Sleep: sleeps well  · Hearing: normal - bilateral   · Vision: sometimes has difficulty with near vision- wants to see eye doctor  · Dental: regular dental visits  /GYN Health  · Last menstrual period: s/p hysterectomy  · Contraceptive method: s/p hysterectomy     Review of Systems:     Review of Systems   Constitutional: Negative for chills and fever  HENT: Negative for congestion and sore throat  Eyes: Negative for pain  Respiratory: Negative for cough, chest tightness and shortness of breath  Cardiovascular: Negative for chest pain, palpitations and leg swelling  Gastrointestinal: Negative for abdominal pain, constipation, diarrhea, nausea and vomiting  Genitourinary: Negative for difficulty urinating and dysuria  Musculoskeletal: Positive for arthralgias (left breast pain)  Negative for back pain  Skin: Negative for rash  Neurological: Positive for headaches (occasionally)  Negative for dizziness and numbness  Psychiatric/Behavioral: The patient is not nervous/anxious         Past Medical History:     Past Medical History:   Diagnosis Date    Anesthesia     "a little disoriented when waking up"    Carpal tunnel syndrome, right     Dental crowns present     Fibromyalgia, primary     History of 2019 novel coronavirus disease (COVID-19) 05/2020    pt reports not hospitalized    Hypertension     Last assessed 4/29/2013 /pt denies    Migraine     Tachycardia     "with use of phentermine/once stopped taking this med it went away"    Uses Lebanese as primary spoken language     Wears glasses     reading      Past Surgical History:     Past Surgical History:   Procedure Laterality Date    HYSTERECTOMY      CA REVISE MEDIAN N/CARPAL TUNNEL SURG Right 7/16/2021    Procedure: CARPAL TUNNEL RELEASE;  Surgeon: Rod Royal MD;  Location: [facility];  Service: Orthopedics    CA REVISE MEDIAN N/CARPAL TUNNEL SURG Left 7/30/2021    Procedure: CARPAL TUNNEL RELEASE; [employer] release;  Surgeon:  Rod Royal MD;  Location: [facility];  Service: Orthopedics    TUBAL LIGATION        Social History:     Social History     Socioeconomic History    Marital status:      Spouse name: None    Number of children: None    Years of education: None    Highest education level: None   Occupational History    None   Tobacco Use    Smoking status: Never Smoker    Smokeless tobacco: Never Used   Vaping Use    Vaping Use: Never used   Substance and Sexual Activity    Alcohol use: No    Drug use: No    Sexual activity: Yes     Partners: Male     Birth control/protection: None   Other Topics Concern    None   Social History Narrative    None     Social Determinants of Health     Financial Resource Strain: Not on file   Food Insecurity: Not on file   Transportation Needs: Not on file   Physical Activity: Not on file   Stress: Not on file   Social Connections: Not on file   Intimate Partner Violence: Not on file   Housing Stability: Not on file      Family History:     Family History   Problem Relation Age of Onset    Diabetes type II Father     Breast cancer Family     Cervical cancer Family     Stomach cancer Family     Hyperlipidemia Family     No Known Problems Mother     No Known Problems Sister     No Known Problems Daughter     No Known Problems Maternal Grandfather     No Known Problems Paternal Grandmother     No Known Problems Paternal Grandfather     No Known Problems Half-Sister     No Known Problems Half-Sister     No Known Problems Daughter     No Known Problems Daughter     No Known Problems Daughter     Breast cancer Maternal Aunt     Breast cancer Maternal Aunt     No Known Problems Paternal Aunt     No Known Problems Paternal Aunt     Breast cancer Cousin     No Known Problems Maternal Aunt     Breast cancer Cousin       Current Medications:     Current Outpatient Medications   Medication Sig Dispense Refill    butalbital-acetaminophen-caffeine (FIORICET,ESGIC) -40 mg per tablet Take 1 tablet by mouth every 6 (six) hours as needed for headaches 15 tablet 0    ibuprofen (Advil) 200 mg tablet Take by mouth every 6 (six) hours as needed for mild pain       No current facility-administered medications for this visit  Allergies:     No Known Allergies   Physical Exam:     /78 (BP Location: Left arm, Patient Position: Sitting, Cuff Size: Standard)   Pulse 71   Temp 97 9 °F (36 6 °C) (Temporal)   Resp 16   Ht 5' 1" (1 549 m)   Wt 70 8 kg (156 lb)   LMP 10/28/2019 Comment: tubal ligation   SpO2 99%   Breastfeeding No   BMI 29 48 kg/m²     Physical Exam  Vitals and nursing note reviewed  Constitutional:       General: She is not in acute distress  Appearance: She is well-developed  HENT:      Head: Normocephalic and atraumatic  Right Ear: External ear normal       Left Ear: External ear normal       Nose: Nose normal    Eyes:      Conjunctiva/sclera: Conjunctivae normal    Cardiovascular:      Rate and Rhythm: Normal rate and regular rhythm  Pulses: Normal pulses  Heart sounds: Normal heart sounds  Pulmonary:      Effort: Pulmonary effort is normal  No respiratory distress  Breath sounds: Normal breath sounds  No wheezing     Chest: Breasts:      Right: Normal  No inverted nipple, mass, nipple discharge, skin change or tenderness  Left: Tenderness present  No inverted nipple, mass, nipple discharge or skin change  Abdominal:      General: Bowel sounds are normal       Palpations: Abdomen is soft  Tenderness: There is no abdominal tenderness  Musculoskeletal:      Cervical back: Normal range of motion and neck supple  Skin:     General: Skin is warm and dry  Neurological:      Mental Status: She is alert and oriented to person, place, and time     Psychiatric:         Behavior: Behavior normal          CARMEN Sellers

## 2022-10-12 PROBLEM — Z11.1 SCREENING FOR TUBERCULOSIS: Status: RESOLVED | Noted: 2021-04-01 | Resolved: 2022-10-12

## 2022-10-18 ENCOUNTER — HOSPITAL ENCOUNTER (OUTPATIENT)
Dept: MAMMOGRAPHY | Facility: CLINIC | Age: 47
Discharge: HOME | End: 2022-10-18
Payer: COMMERCIAL

## 2022-10-18 VITALS — WEIGHT: 156 LBS | HEIGHT: 61 IN | BODY MASS INDEX: 29.45 KG/M2

## 2022-10-18 DIAGNOSIS — N64.4 BREAST PAIN, LEFT: ICD-10-CM

## 2022-10-18 PROCEDURE — G0279 TOMOSYNTHESIS, MAMMO: HCPCS

## 2022-10-18 PROCEDURE — 76642 ULTRASOUND BREAST LIMITED: CPT

## 2022-10-18 PROCEDURE — 77065 DX MAMMO INCL CAD UNI: CPT

## 2022-11-09 ENCOUNTER — OFFICE VISIT (OUTPATIENT)
Dept: FAMILY MEDICINE CLINIC | Facility: CLINIC | Age: 47
End: 2022-11-09

## 2022-11-09 VITALS
TEMPERATURE: 96.2 F | HEART RATE: 82 BPM | WEIGHT: 162 LBS | RESPIRATION RATE: 18 BRPM | HEIGHT: 61 IN | DIASTOLIC BLOOD PRESSURE: 88 MMHG | SYSTOLIC BLOOD PRESSURE: 126 MMHG | BODY MASS INDEX: 30.58 KG/M2 | OXYGEN SATURATION: 99 %

## 2022-11-09 DIAGNOSIS — N64.4 BREAST PAIN, LEFT: ICD-10-CM

## 2022-11-09 DIAGNOSIS — J30.9 ALLERGIC RHINITIS, UNSPECIFIED SEASONALITY, UNSPECIFIED TRIGGER: Primary | ICD-10-CM

## 2022-11-09 DIAGNOSIS — R07.9 CHEST PAIN, UNSPECIFIED TYPE: ICD-10-CM

## 2022-11-09 DIAGNOSIS — G43.709 CHRONIC MIGRAINE WITHOUT AURA WITHOUT STATUS MIGRAINOSUS, NOT INTRACTABLE: ICD-10-CM

## 2022-11-09 RX ORDER — BUTALBITAL, ACETAMINOPHEN AND CAFFEINE 50; 325; 40 MG/1; MG/1; MG/1
1 TABLET ORAL EVERY 6 HOURS PRN
Qty: 15 TABLET | Refills: 0 | Status: SHIPPED | OUTPATIENT
Start: 2022-11-09

## 2022-11-09 RX ORDER — FLUTICASONE PROPIONATE 50 MCG
1 SPRAY, SUSPENSION (ML) NASAL DAILY
Qty: 16 G | Refills: 1 | Status: SHIPPED | OUTPATIENT
Start: 2022-11-09

## 2022-11-09 NOTE — PROGRESS NOTES
Assessment/Plan:    Allergic rhinitis  - Stable  Continue Flonase  Migraine headache  - Stable  Continue Fioricet as needed  - PDMP reviewed  No red flags noted  Breast pain, left  - Reviewed results of left breast ultrasound and diagnostic mammogram of left breast from 10/18/2022 with patient  Results show no suspicious mass or shadowing  No evidence of malignancy  Typical appearing left axillary lymph node without cortical thickening   - Advised to apply ice/ heating pad as needed to affected area  - Advised to take Tylenol/ ibuprofen as needed for pain  Chest pain  - POCT EKG shows normal sinus rhythm @ 64 bpm  Unchanged from prior EKG on 4/28/21   - Due to persistent chest pain, will obtain exercise stress test for further evaluation  - ED parameters discussed with patient  Diagnoses and all orders for this visit:    Allergic rhinitis, unspecified seasonality, unspecified trigger  -     fluticasone (FLONASE) 50 mcg/act nasal spray; 1 spray into each nostril daily    Chest pain, unspecified type  -     POCT ECG  -     Stress test only, exercise; Future    Chronic migraine without aura without status migrainosus, not intractable  -     butalbital-acetaminophen-caffeine (FIORICET,ESGIC) -40 mg per tablet; Take 1 tablet by mouth every 6 (six) hours as needed for headaches    Breast pain, left          All of patients questions were answered  Patient understands and agrees with the above plan  Wero Grimm PA-C  11/09/22  [address] [facility] [place]          Subjective:     Patient ID: Ángel Hernandez  is a 41 y o  female with known PMH of   Allergic rhinitis, migraine who presents today in office for   Left breast pain follow-up  - Patient is a 41 y o  female who presents today for  Left breast pain follow-up  Patient notes she continues with intermittent left breast/chest pain  Patient notes she is worried her heart may be causing the pain and would like to check this out further  Patient denies any associated dizziness, palpitations, shortness of breath, lower leg swelling  Patient notes she did have a stress test a few years ago which she believes was normal   Patient notes recently she has been under lot of stress taking care of her mother and also planning her daughter's 16th birthday party     - Of note, patient notes she has now started taking daily vitamin B12, magnesium, and biotin supplements  Patient notes her OBGYN, Dr Zoë Suresh, also prescribed her a pill to help with her hot flashes  The following portions of the patient's history were reviewed and updated as appropriate: allergies, current medications, past family history, past medical history, past social history, past surgical history and problem list         Review of Systems   Constitutional: Negative for chills and fever  HENT: Negative for congestion and sore throat  Eyes: Negative for pain  Respiratory: Negative for cough, chest tightness and shortness of breath  Cardiovascular: Positive for chest pain  Negative for palpitations and leg swelling  Gastrointestinal: Negative for abdominal pain, constipation, diarrhea, nausea and vomiting  Genitourinary: Negative for difficulty urinating and dysuria  Musculoskeletal: Positive for arthralgias (left breast pain)  Negative for back pain  Skin: Negative for rash  Neurological: Positive for headaches (occasionally)  Negative for dizziness and numbness  Psychiatric/Behavioral: The patient is not nervous/anxious  Objective:   Vitals:    11/09/22 1441   BP: 126/88   BP Location: Left arm   Patient Position: Sitting   Cuff Size: Adult   Pulse: 82   Resp: 18   Temp: (!) 96 2 °F (35 7 °C)   TempSrc: Temporal   SpO2: 99%   Weight: 73 5 kg (162 lb)   Height: 5' 1" (1 549 m)         Physical Exam  Vitals and nursing note reviewed  Constitutional:       General: She is not in acute distress  Appearance: She is well-developed     HENT: Head: Normocephalic and atraumatic  Right Ear: External ear normal       Left Ear: External ear normal       Nose: Nose normal    Eyes:      Conjunctiva/sclera: Conjunctivae normal    Cardiovascular:      Rate and Rhythm: Normal rate and regular rhythm  Pulses: Normal pulses  Heart sounds: Normal heart sounds  Pulmonary:      Effort: Pulmonary effort is normal  No respiratory distress  Breath sounds: Normal breath sounds  No wheezing  Chest:   Breasts:      Right: Normal  No inverted nipple, mass, nipple discharge, skin change or tenderness  Left: Tenderness present  No inverted nipple, mass, nipple discharge or skin change  Musculoskeletal:      Cervical back: Normal range of motion and neck supple  Right lower leg: No edema  Left lower leg: No edema  Skin:     General: Skin is warm and dry  Neurological:      Mental Status: She is alert and oriented to person, place, and time     Psychiatric:         Behavior: Behavior normal

## 2022-11-10 PROBLEM — N64.4 BREAST PAIN, LEFT: Status: ACTIVE | Noted: 2022-11-10

## 2022-11-10 NOTE — ASSESSMENT & PLAN NOTE
- Reviewed results of left breast ultrasound and diagnostic mammogram of left breast from 10/18/2022 with patient  Results show no suspicious mass or shadowing  No evidence of malignancy  Typical appearing left axillary lymph node without cortical thickening   - Advised to apply ice/ heating pad as needed to affected area  - Advised to take Tylenol/ ibuprofen as needed for pain

## 2022-11-10 NOTE — ASSESSMENT & PLAN NOTE
- POCT EKG shows normal sinus rhythm @ 64 bpm  Unchanged from prior EKG on 4/28/21   - Due to persistent chest pain, will obtain exercise stress test for further evaluation  - ED parameters discussed with patient

## 2022-11-22 ENCOUNTER — HOSPITAL ENCOUNTER (OUTPATIENT)
Dept: NON INVASIVE DIAGNOSTICS | Facility: HOSPITAL | Age: 47
Discharge: HOME | End: 2022-11-22

## 2022-11-22 VITALS — BODY MASS INDEX: 30.58 KG/M2 | WEIGHT: 162 LBS | HEIGHT: 61 IN

## 2022-11-22 DIAGNOSIS — R07.9 CHEST PAIN, UNSPECIFIED TYPE: ICD-10-CM

## 2022-11-22 LAB
BASELINE ST DEPRESSION: 0 MM
MAX HR PERCENT: 94 %
MAX HR: 166 BPM
RATE PRESSURE PRODUCT: NORMAL
SL CV STRESS RECOVERY BP: NORMAL MMHG
SL CV STRESS RECOVERY HR: 96 BPM
SL CV STRESS RECOVERY O2 SAT: 98 %
SL CV STRESS STAGE REACHED: 3
STRESS ANGINA INDEX: 0
STRESS BASELINE BP: NORMAL MMHG
STRESS BASELINE HR: 83 BPM
STRESS DUKE TREADMILL SCORE: 7
STRESS O2 SAT REST: 99 %
STRESS PEAK HR: 166 BPM
STRESS POST ESTIMATED WORKLOAD: 8.6 METS
STRESS POST EXERCISE DUR MIN: 7 MIN
STRESS POST EXERCISE DUR SEC: 6 SEC
STRESS POST O2 SAT PEAK: 96 %
STRESS POST PEAK BP: 158 MMHG
STRESS ST DEPRESSION: 0 MM

## 2022-11-25 LAB
CHEST PAIN STATEMENT: NORMAL
MAX DIASTOLIC BP: 84 MMHG
MAX HEART RATE: 166 BPM
MAX PREDICTED HEART RATE: 175 BPM
MAX. SYSTOLIC BP: 158 MMHG
PROTOCOL NAME: NORMAL
TARGET HR FORMULA: NORMAL
TEST INDICATION: NORMAL
TIME IN EXERCISE PHASE: NORMAL

## 2023-07-18 ENCOUNTER — APPOINTMENT (OUTPATIENT)
Dept: LAB | Facility: CLINIC | Age: 48
End: 2023-07-18
Payer: COMMERCIAL

## 2023-07-18 DIAGNOSIS — E66.3 OVERWEIGHT: ICD-10-CM

## 2023-07-18 LAB
ALBUMIN SERPL BCP-MCNC: 4.1 G/DL (ref 3.5–5)
ALP SERPL-CCNC: 83 U/L (ref 46–116)
ALT SERPL W P-5'-P-CCNC: 39 U/L (ref 12–78)
ANION GAP SERPL CALCULATED.3IONS-SCNC: 2 MMOL/L
AST SERPL W P-5'-P-CCNC: 21 U/L (ref 5–45)
BASOPHILS # BLD AUTO: 0.05 THOUSANDS/ÂΜL (ref 0–0.1)
BASOPHILS NFR BLD AUTO: 1 % (ref 0–1)
BILIRUB SERPL-MCNC: 0.73 MG/DL (ref 0.2–1)
BUN SERPL-MCNC: 14 MG/DL (ref 5–25)
CALCIUM SERPL-MCNC: 9.3 MG/DL (ref 8.3–10.1)
CHLORIDE SERPL-SCNC: 110 MMOL/L (ref 96–108)
CHOLEST SERPL-MCNC: 174 MG/DL
CO2 SERPL-SCNC: 26 MMOL/L (ref 21–32)
CREAT SERPL-MCNC: 0.67 MG/DL (ref 0.6–1.3)
EOSINOPHIL # BLD AUTO: 0.17 THOUSAND/ÂΜL (ref 0–0.61)
EOSINOPHIL NFR BLD AUTO: 2 % (ref 0–6)
ERYTHROCYTE [DISTWIDTH] IN BLOOD BY AUTOMATED COUNT: 12.1 % (ref 11.6–15.1)
EST. AVERAGE GLUCOSE BLD GHB EST-MCNC: 111 MG/DL
GFR SERPL CREATININE-BSD FRML MDRD: 105 ML/MIN/1.73SQ M
GLUCOSE P FAST SERPL-MCNC: 85 MG/DL (ref 65–99)
HBA1C MFR BLD: 5.5 %
HCT VFR BLD AUTO: 43.7 % (ref 34.8–46.1)
HDLC SERPL-MCNC: 50 MG/DL
HGB BLD-MCNC: 13.8 G/DL (ref 11.5–15.4)
IMM GRANULOCYTES # BLD AUTO: 0.02 THOUSAND/UL (ref 0–0.2)
IMM GRANULOCYTES NFR BLD AUTO: 0 % (ref 0–2)
LDLC SERPL CALC-MCNC: 104 MG/DL (ref 0–100)
LYMPHOCYTES # BLD AUTO: 3.32 THOUSANDS/ÂΜL (ref 0.6–4.47)
LYMPHOCYTES NFR BLD AUTO: 43 % (ref 14–44)
MCH RBC QN AUTO: 28.6 PG (ref 26.8–34.3)
MCHC RBC AUTO-ENTMCNC: 31.6 G/DL (ref 31.4–37.4)
MCV RBC AUTO: 91 FL (ref 82–98)
MONOCYTES # BLD AUTO: 0.65 THOUSAND/ÂΜL (ref 0.17–1.22)
MONOCYTES NFR BLD AUTO: 8 % (ref 4–12)
NEUTROPHILS # BLD AUTO: 3.61 THOUSANDS/ÂΜL (ref 1.85–7.62)
NEUTS SEG NFR BLD AUTO: 46 % (ref 43–75)
NONHDLC SERPL-MCNC: 124 MG/DL
NRBC BLD AUTO-RTO: 0 /100 WBCS
PLATELET # BLD AUTO: 284 THOUSANDS/UL (ref 149–390)
PMV BLD AUTO: 10.5 FL (ref 8.9–12.7)
POTASSIUM SERPL-SCNC: 4 MMOL/L (ref 3.5–5.3)
PROT SERPL-MCNC: 8 G/DL (ref 6.4–8.4)
RBC # BLD AUTO: 4.83 MILLION/UL (ref 3.81–5.12)
SODIUM SERPL-SCNC: 138 MMOL/L (ref 135–147)
TRIGL SERPL-MCNC: 98 MG/DL
TSH SERPL DL<=0.05 MIU/L-ACNC: 1.2 UIU/ML (ref 0.45–4.5)
WBC # BLD AUTO: 7.82 THOUSAND/UL (ref 4.31–10.16)

## 2023-07-18 PROCEDURE — 80053 COMPREHEN METABOLIC PANEL: CPT

## 2023-07-18 PROCEDURE — 84443 ASSAY THYROID STIM HORMONE: CPT

## 2023-07-18 PROCEDURE — 85025 COMPLETE CBC W/AUTO DIFF WBC: CPT

## 2023-07-18 PROCEDURE — 83036 HEMOGLOBIN GLYCOSYLATED A1C: CPT

## 2023-07-18 PROCEDURE — 80061 LIPID PANEL: CPT

## 2023-07-18 PROCEDURE — 36415 COLL VENOUS BLD VENIPUNCTURE: CPT

## 2023-08-05 ENCOUNTER — TELEPHONE (OUTPATIENT)
Dept: FAMILY MEDICINE CLINIC | Facility: CLINIC | Age: 48
End: 2023-08-05

## 2023-09-14 ENCOUNTER — VBI (OUTPATIENT)
Dept: ADMINISTRATIVE | Facility: OTHER | Age: 48
End: 2023-09-14

## (undated) DEVICE — GAUZE SPONGES,USP TYPE VII GAUZE, 12 PLY: Brand: CURITY

## (undated) DEVICE — TOWEL SET X-RAY

## (undated) DEVICE — NEEDLE 25G X 1 1/2

## (undated) DEVICE — LIGHT GLOVE GREEN

## (undated) DEVICE — GLOVE INDICATOR PI UNDERGLOVE SZ 6.5 BLUE

## (undated) DEVICE — PAD CAST 4 IN COTTON NON STERILE

## (undated) DEVICE — DRAPE SHEET THREE QUARTER

## (undated) DEVICE — NEEDLE COUNTER LG W/RULER

## (undated) DEVICE — INTENDED FOR TISSUE SEPARATION, AND OTHER PROCEDURES THAT REQUIRE A SHARP SURGICAL BLADE TO PUNCTURE OR CUT.: Brand: BARD-PARKER ® CARBON RIB-BACK BLADES

## (undated) DEVICE — ACE WRAP 4 IN UNSTERILE

## (undated) DEVICE — GLOVE INDICATOR PI UNDERGLOVE SZ 8 BLUE

## (undated) DEVICE — GLOVE PI ULTRA TOUCH SZ.8.0

## (undated) DEVICE — CHLORAPREP HI-LITE 26ML ORANGE

## (undated) DEVICE — BANDAGE, ESMARK LF STR 4"X9'(20/CS): Brand: CYPRESS

## (undated) DEVICE — CURITY NON-ADHERENT STRIPS: Brand: CURITY

## (undated) DEVICE — SYRINGE BULB 2 OZ

## (undated) DEVICE — TIBURON HAND DRAPE: Brand: CONVERTORS

## (undated) DEVICE — HEAVY DUTY TABLE COVER: Brand: CONVERTORS

## (undated) DEVICE — GAUZE SPONGES,16 PLY: Brand: CURITY

## (undated) DEVICE — SKIN MARKER DUAL TIP WITH RULER CAP, FLEXIBLE RULER AND LABELS: Brand: DEVON

## (undated) DEVICE — KNIFE CARPAL TUNNEL DISP

## (undated) DEVICE — SPONGE 4 X 4 XRAY 16 PLY STRL LF RFD

## (undated) DEVICE — BOWL: 16OZ PEELPOUCH 75/CS 16/PLT: Brand: MEDEGEN MEDICAL PRODUCTS, LLC

## (undated) DEVICE — ASTOUND STANDARD SURGICAL GOWN, XL: Brand: CONVERTORS

## (undated) DEVICE — SUT ETHILON 4-0 PS-2 18 IN 1667H

## (undated) DEVICE — CUFF TOURNIQUET 18 X 4 IN QUICK CONNECT DISP 1 BLADDER

## (undated) DEVICE — NEEDLE 18 G X 1 1/2

## (undated) DEVICE — GLOVE PI ULTRA TOUCH SZ.6.5

## (undated) DEVICE — ACE WRAP 4 IN STERILE

## (undated) DEVICE — PADDING CAST 3IN COTTON STRL

## (undated) DEVICE — SPLINT 4 X 15 IN FAST SET PLASTER

## (undated) DEVICE — SYRINGE 10ML LL

## (undated) DEVICE — ACE WRAP 3 IN STERILE